# Patient Record
Sex: FEMALE | Race: WHITE | Employment: OTHER | URBAN - METROPOLITAN AREA
[De-identification: names, ages, dates, MRNs, and addresses within clinical notes are randomized per-mention and may not be internally consistent; named-entity substitution may affect disease eponyms.]

---

## 2017-01-17 ENCOUNTER — OFFICE VISIT (OUTPATIENT)
Dept: OBGYN | Age: 72
End: 2017-01-17

## 2017-01-17 VITALS
SYSTOLIC BLOOD PRESSURE: 138 MMHG | HEIGHT: 63 IN | BODY MASS INDEX: 31.36 KG/M2 | DIASTOLIC BLOOD PRESSURE: 84 MMHG | WEIGHT: 177 LBS

## 2017-01-17 DIAGNOSIS — M85.80 OSTEOPENIA: ICD-10-CM

## 2017-01-17 DIAGNOSIS — N81.89 PELVIC FLOOR WEAKNESS IN FEMALE: ICD-10-CM

## 2017-01-17 DIAGNOSIS — R32 INCONTINENCE IN FEMALE: Primary | ICD-10-CM

## 2017-01-17 PROCEDURE — 3017F COLORECTAL CA SCREEN DOC REV: CPT | Performed by: OBSTETRICS & GYNECOLOGY

## 2017-01-17 PROCEDURE — G8419 CALC BMI OUT NRM PARAM NOF/U: HCPCS | Performed by: OBSTETRICS & GYNECOLOGY

## 2017-01-17 PROCEDURE — 1090F PRES/ABSN URINE INCON ASSESS: CPT | Performed by: OBSTETRICS & GYNECOLOGY

## 2017-01-17 PROCEDURE — G8427 DOCREV CUR MEDS BY ELIG CLIN: HCPCS | Performed by: OBSTETRICS & GYNECOLOGY

## 2017-01-17 PROCEDURE — G8399 PT W/DXA RESULTS DOCUMENT: HCPCS | Performed by: OBSTETRICS & GYNECOLOGY

## 2017-01-17 PROCEDURE — 99213 OFFICE O/P EST LOW 20 MIN: CPT | Performed by: OBSTETRICS & GYNECOLOGY

## 2017-01-17 PROCEDURE — 0509F URINE INCON PLAN DOCD: CPT | Performed by: OBSTETRICS & GYNECOLOGY

## 2017-01-17 PROCEDURE — G8484 FLU IMMUNIZE NO ADMIN: HCPCS | Performed by: OBSTETRICS & GYNECOLOGY

## 2017-01-17 PROCEDURE — 1123F ACP DISCUSS/DSCN MKR DOCD: CPT | Performed by: OBSTETRICS & GYNECOLOGY

## 2017-01-17 PROCEDURE — 1036F TOBACCO NON-USER: CPT | Performed by: OBSTETRICS & GYNECOLOGY

## 2017-01-17 PROCEDURE — 3014F SCREEN MAMMO DOC REV: CPT | Performed by: OBSTETRICS & GYNECOLOGY

## 2017-01-17 PROCEDURE — 4040F PNEUMOC VAC/ADMIN/RCVD: CPT | Performed by: OBSTETRICS & GYNECOLOGY

## 2017-01-17 RX ORDER — RALOXIFENE HYDROCHLORIDE 60 MG/1
TABLET, FILM COATED ORAL
Qty: 90 TABLET | Refills: 11 | Status: SHIPPED | OUTPATIENT
Start: 2017-01-17 | End: 2018-01-23 | Stop reason: SDUPTHER

## 2017-01-17 RX ORDER — RALOXIFENE HYDROCHLORIDE 60 MG/1
60 TABLET, FILM COATED ORAL DAILY
Qty: 30 TABLET | Refills: 11 | Status: SHIPPED | OUTPATIENT
Start: 2017-01-17 | End: 2017-01-17 | Stop reason: SDUPTHER

## 2017-01-24 ENCOUNTER — OFFICE VISIT (OUTPATIENT)
Dept: PRIMARY CARE CLINIC | Age: 72
End: 2017-01-24

## 2017-01-24 VITALS
RESPIRATION RATE: 14 BRPM | SYSTOLIC BLOOD PRESSURE: 126 MMHG | WEIGHT: 178 LBS | TEMPERATURE: 97 F | OXYGEN SATURATION: 97 % | HEIGHT: 63 IN | DIASTOLIC BLOOD PRESSURE: 84 MMHG | HEART RATE: 84 BPM | BODY MASS INDEX: 31.54 KG/M2

## 2017-01-24 DIAGNOSIS — F41.9 ANXIETY: Primary | ICD-10-CM

## 2017-01-24 DIAGNOSIS — R42 VERTIGO: ICD-10-CM

## 2017-01-24 PROCEDURE — 1036F TOBACCO NON-USER: CPT | Performed by: FAMILY MEDICINE

## 2017-01-24 ASSESSMENT — ENCOUNTER SYMPTOMS
SHORTNESS OF BREATH: 0
CHEST TIGHTNESS: 0

## 2017-01-26 RX ORDER — DOCUSATE SODIUM 100 MG/1
CAPSULE, LIQUID FILLED ORAL
Qty: 30 CAPSULE | Refills: 0 | Status: SHIPPED | OUTPATIENT
Start: 2017-01-26 | End: 2017-02-26 | Stop reason: SDUPTHER

## 2017-01-30 ENCOUNTER — TELEPHONE (OUTPATIENT)
Dept: OBGYN | Age: 72
End: 2017-01-30

## 2017-01-30 DIAGNOSIS — R32 URINARY INCONTINENCE, UNSPECIFIED TYPE: Primary | ICD-10-CM

## 2017-02-05 ASSESSMENT — ENCOUNTER SYMPTOMS: ABDOMINAL PAIN: 0

## 2017-02-06 ENCOUNTER — TELEPHONE (OUTPATIENT)
Dept: OBGYN | Age: 72
End: 2017-02-06

## 2017-02-08 ENCOUNTER — TELEPHONE (OUTPATIENT)
Dept: PRIMARY CARE CLINIC | Age: 72
End: 2017-02-08

## 2017-02-09 DIAGNOSIS — J00 ACUTE NASOPHARYNGITIS: ICD-10-CM

## 2017-02-09 RX ORDER — AZITHROMYCIN 250 MG/1
TABLET, FILM COATED ORAL
Qty: 1 PACKET | Refills: 0 | Status: SHIPPED | OUTPATIENT
Start: 2017-02-09 | End: 2021-01-18 | Stop reason: SDUPTHER

## 2017-02-13 ENCOUNTER — TELEPHONE (OUTPATIENT)
Dept: PRIMARY CARE CLINIC | Age: 72
End: 2017-02-13

## 2017-02-13 DIAGNOSIS — N39.0 URINARY TRACT INFECTION, SITE UNSPECIFIED: Primary | ICD-10-CM

## 2017-02-13 RX ORDER — NITROFURANTOIN 25; 75 MG/1; MG/1
100 CAPSULE ORAL 2 TIMES DAILY
Qty: 20 CAPSULE | Refills: 0 | Status: SHIPPED | OUTPATIENT
Start: 2017-02-13 | End: 2018-10-09 | Stop reason: SDUPTHER

## 2017-02-14 ENCOUNTER — NURSE ONLY (OUTPATIENT)
Dept: PRIMARY CARE CLINIC | Age: 72
End: 2017-02-14

## 2017-02-14 DIAGNOSIS — N39.0 URINARY TRACT INFECTION, SITE UNSPECIFIED: ICD-10-CM

## 2017-02-14 DIAGNOSIS — N39.0 URINARY TRACT INFECTION, SITE UNSPECIFIED: Primary | ICD-10-CM

## 2017-02-16 LAB — URINE CULTURE, ROUTINE: NORMAL

## 2017-02-27 RX ORDER — CONJUGATED ESTROGENS 0.62 MG/G
CREAM VAGINAL
Qty: 30 G | Refills: 0 | Status: SHIPPED | OUTPATIENT
Start: 2017-02-27 | End: 2017-03-20

## 2017-02-27 RX ORDER — DOCUSATE SODIUM 100 MG
CAPSULE ORAL
Qty: 30 CAPSULE | Refills: 5 | Status: SHIPPED | OUTPATIENT
Start: 2017-02-27 | End: 2017-08-14 | Stop reason: SDUPTHER

## 2017-03-20 ENCOUNTER — OFFICE VISIT (OUTPATIENT)
Dept: PRIMARY CARE CLINIC | Age: 72
End: 2017-03-20

## 2017-03-20 VITALS
WEIGHT: 179 LBS | DIASTOLIC BLOOD PRESSURE: 74 MMHG | RESPIRATION RATE: 16 BRPM | SYSTOLIC BLOOD PRESSURE: 120 MMHG | HEIGHT: 63 IN | BODY MASS INDEX: 31.71 KG/M2 | HEART RATE: 79 BPM | OXYGEN SATURATION: 99 %

## 2017-03-20 DIAGNOSIS — R35.0 URINARY FREQUENCY: Primary | ICD-10-CM

## 2017-03-20 DIAGNOSIS — N39.41 URGE INCONTINENCE OF URINE: ICD-10-CM

## 2017-03-20 LAB
BILIRUBIN, POC: NORMAL
BLOOD URINE, POC: NORMAL
CLARITY, POC: CLEAR
COLOR, POC: YELLOW
GLUCOSE URINE, POC: NORMAL
KETONES, POC: NORMAL
LEUKOCYTE EST, POC: NORMAL
NITRITE, POC: NORMAL
PH, POC: 6
PROTEIN, POC: NORMAL
SPECIFIC GRAVITY, POC: 1.03
UROBILINOGEN, POC: NORMAL

## 2017-03-20 PROCEDURE — G8399 PT W/DXA RESULTS DOCUMENT: HCPCS | Performed by: FAMILY MEDICINE

## 2017-03-20 PROCEDURE — 1036F TOBACCO NON-USER: CPT | Performed by: FAMILY MEDICINE

## 2017-03-20 PROCEDURE — 99213 OFFICE O/P EST LOW 20 MIN: CPT | Performed by: FAMILY MEDICINE

## 2017-03-20 PROCEDURE — 3014F SCREEN MAMMO DOC REV: CPT | Performed by: FAMILY MEDICINE

## 2017-03-20 PROCEDURE — G8484 FLU IMMUNIZE NO ADMIN: HCPCS | Performed by: FAMILY MEDICINE

## 2017-03-20 PROCEDURE — 4040F PNEUMOC VAC/ADMIN/RCVD: CPT | Performed by: FAMILY MEDICINE

## 2017-03-20 PROCEDURE — 1123F ACP DISCUSS/DSCN MKR DOCD: CPT | Performed by: FAMILY MEDICINE

## 2017-03-20 PROCEDURE — 0509F URINE INCON PLAN DOCD: CPT | Performed by: FAMILY MEDICINE

## 2017-03-20 PROCEDURE — G8427 DOCREV CUR MEDS BY ELIG CLIN: HCPCS | Performed by: FAMILY MEDICINE

## 2017-03-20 PROCEDURE — 1090F PRES/ABSN URINE INCON ASSESS: CPT | Performed by: FAMILY MEDICINE

## 2017-03-20 PROCEDURE — G8417 CALC BMI ABV UP PARAM F/U: HCPCS | Performed by: FAMILY MEDICINE

## 2017-03-20 PROCEDURE — 3017F COLORECTAL CA SCREEN DOC REV: CPT | Performed by: FAMILY MEDICINE

## 2017-03-20 PROCEDURE — 81003 URINALYSIS AUTO W/O SCOPE: CPT | Performed by: FAMILY MEDICINE

## 2017-03-20 RX ORDER — FESOTERODINE FUMARATE 4 MG/1
4 TABLET, EXTENDED RELEASE ORAL DAILY
Qty: 30 TABLET | Refills: 1 | COMMUNITY
Start: 2017-03-20 | End: 2017-04-05 | Stop reason: SDUPTHER

## 2017-03-20 ASSESSMENT — ENCOUNTER SYMPTOMS
DIARRHEA: 0
NAUSEA: 0
SHORTNESS OF BREATH: 0
CONSTIPATION: 0
VOMITING: 0
ABDOMINAL PAIN: 0

## 2017-04-05 DIAGNOSIS — N39.41 URGE INCONTINENCE OF URINE: ICD-10-CM

## 2017-04-06 RX ORDER — FESOTERODINE FUMARATE 4 MG/1
4 TABLET, EXTENDED RELEASE ORAL DAILY
Qty: 30 TABLET | Refills: 1 | Status: SHIPPED | OUTPATIENT
Start: 2017-04-06 | End: 2017-06-21 | Stop reason: SDUPTHER

## 2017-04-11 ENCOUNTER — TELEPHONE (OUTPATIENT)
Dept: PRIMARY CARE CLINIC | Age: 72
End: 2017-04-11

## 2017-04-21 DIAGNOSIS — F41.9 ANXIETY: ICD-10-CM

## 2017-04-21 RX ORDER — CHLORDIAZEPOXIDE HYDROCHLORIDE AND CLIDINIUM BROMIDE 5; 2.5 MG/1; MG/1
1 CAPSULE ORAL 2 TIMES DAILY PRN
Qty: 60 CAPSULE | Refills: 0 | Status: SHIPPED | OUTPATIENT
Start: 2017-04-21 | End: 2017-10-02

## 2017-04-21 RX ORDER — ALPRAZOLAM 0.5 MG/1
0.5 TABLET ORAL 3 TIMES DAILY PRN
Qty: 90 TABLET | Refills: 2 | Status: SHIPPED | OUTPATIENT
Start: 2017-04-21 | End: 2017-06-12 | Stop reason: SDUPTHER

## 2017-04-27 ENCOUNTER — OFFICE VISIT (OUTPATIENT)
Dept: PRIMARY CARE CLINIC | Age: 72
End: 2017-04-27

## 2017-04-27 VITALS
HEART RATE: 72 BPM | TEMPERATURE: 97.1 F | DIASTOLIC BLOOD PRESSURE: 84 MMHG | HEIGHT: 63 IN | BODY MASS INDEX: 31.36 KG/M2 | RESPIRATION RATE: 12 BRPM | SYSTOLIC BLOOD PRESSURE: 126 MMHG | WEIGHT: 177 LBS

## 2017-04-27 DIAGNOSIS — M54.16 ACUTE RIGHT LUMBAR RADICULOPATHY: Primary | ICD-10-CM

## 2017-04-27 DIAGNOSIS — M54.41 ACUTE RIGHT-SIDED LOW BACK PAIN WITH RIGHT-SIDED SCIATICA: ICD-10-CM

## 2017-04-27 DIAGNOSIS — Z23 NEED FOR TETANUS BOOSTER: ICD-10-CM

## 2017-04-27 DIAGNOSIS — M54.31 SCIATICA OF RIGHT SIDE: ICD-10-CM

## 2017-04-27 PROCEDURE — 3017F COLORECTAL CA SCREEN DOC REV: CPT | Performed by: FAMILY MEDICINE

## 2017-04-27 PROCEDURE — 96372 THER/PROPH/DIAG INJ SC/IM: CPT | Performed by: FAMILY MEDICINE

## 2017-04-27 PROCEDURE — G8427 DOCREV CUR MEDS BY ELIG CLIN: HCPCS | Performed by: FAMILY MEDICINE

## 2017-04-27 PROCEDURE — 1090F PRES/ABSN URINE INCON ASSESS: CPT | Performed by: FAMILY MEDICINE

## 2017-04-27 PROCEDURE — 3014F SCREEN MAMMO DOC REV: CPT | Performed by: FAMILY MEDICINE

## 2017-04-27 PROCEDURE — 1036F TOBACCO NON-USER: CPT | Performed by: FAMILY MEDICINE

## 2017-04-27 PROCEDURE — 90714 TD VACC NO PRESV 7 YRS+ IM: CPT | Performed by: FAMILY MEDICINE

## 2017-04-27 PROCEDURE — 1123F ACP DISCUSS/DSCN MKR DOCD: CPT | Performed by: FAMILY MEDICINE

## 2017-04-27 PROCEDURE — G8417 CALC BMI ABV UP PARAM F/U: HCPCS | Performed by: FAMILY MEDICINE

## 2017-04-27 PROCEDURE — 4040F PNEUMOC VAC/ADMIN/RCVD: CPT | Performed by: FAMILY MEDICINE

## 2017-04-27 PROCEDURE — G8399 PT W/DXA RESULTS DOCUMENT: HCPCS | Performed by: FAMILY MEDICINE

## 2017-04-27 PROCEDURE — 90471 IMMUNIZATION ADMIN: CPT | Performed by: FAMILY MEDICINE

## 2017-04-27 PROCEDURE — 99214 OFFICE O/P EST MOD 30 MIN: CPT | Performed by: FAMILY MEDICINE

## 2017-04-27 RX ORDER — KETOROLAC TROMETHAMINE 30 MG/ML
60 INJECTION, SOLUTION INTRAMUSCULAR; INTRAVENOUS ONCE
Status: COMPLETED | OUTPATIENT
Start: 2017-04-27 | End: 2017-04-27

## 2017-04-27 RX ORDER — PREDNISONE 10 MG/1
TABLET ORAL
Qty: 20 TABLET | Refills: 0 | Status: SHIPPED | OUTPATIENT
Start: 2017-04-27 | End: 2017-05-07

## 2017-04-27 RX ORDER — METHOCARBAMOL 500 MG/1
500 TABLET, FILM COATED ORAL EVERY EVENING
Qty: 30 TABLET | Refills: 3 | Status: SHIPPED | OUTPATIENT
Start: 2017-04-27 | End: 2017-05-27

## 2017-04-27 RX ADMIN — KETOROLAC TROMETHAMINE 60 MG: 30 INJECTION, SOLUTION INTRAMUSCULAR; INTRAVENOUS at 12:00

## 2017-04-27 ASSESSMENT — ENCOUNTER SYMPTOMS
COUGH: 0
SHORTNESS OF BREATH: 0
BOWEL INCONTINENCE: 0
ABDOMINAL PAIN: 0
BACK PAIN: 1

## 2017-05-01 RX ORDER — CONJUGATED ESTROGENS 0.3 MG/1
TABLET, FILM COATED ORAL
Qty: 30 TABLET | Refills: 12 | Status: SHIPPED | OUTPATIENT
Start: 2017-05-01 | End: 2018-05-07 | Stop reason: SDUPTHER

## 2017-05-15 RX ORDER — CONJUGATED ESTROGENS 0.62 MG/G
CREAM VAGINAL
Qty: 30 G | Refills: 0 | Status: SHIPPED | OUTPATIENT
Start: 2017-05-15 | End: 2018-05-07 | Stop reason: SDUPTHER

## 2017-06-08 DIAGNOSIS — R42 VERTIGO: ICD-10-CM

## 2017-06-08 RX ORDER — MECLIZINE HYDROCHLORIDE 25 MG/1
25 TABLET ORAL 2 TIMES DAILY PRN
Qty: 30 TABLET | Refills: 2 | Status: SHIPPED | OUTPATIENT
Start: 2017-06-08 | End: 2017-06-12 | Stop reason: SDUPTHER

## 2017-06-12 ENCOUNTER — OFFICE VISIT (OUTPATIENT)
Dept: PRIMARY CARE CLINIC | Age: 72
End: 2017-06-12

## 2017-06-12 VITALS
OXYGEN SATURATION: 98 % | TEMPERATURE: 97.1 F | BODY MASS INDEX: 32.18 KG/M2 | RESPIRATION RATE: 16 BRPM | HEIGHT: 63 IN | SYSTOLIC BLOOD PRESSURE: 136 MMHG | WEIGHT: 181.6 LBS | HEART RATE: 75 BPM | DIASTOLIC BLOOD PRESSURE: 80 MMHG

## 2017-06-12 DIAGNOSIS — K58.0 IRRITABLE BOWEL SYNDROME WITH DIARRHEA: Primary | ICD-10-CM

## 2017-06-12 DIAGNOSIS — J30.89 OTHER ALLERGIC RHINITIS: ICD-10-CM

## 2017-06-12 DIAGNOSIS — R42 VERTIGO: ICD-10-CM

## 2017-06-12 DIAGNOSIS — F41.9 ANXIETY: ICD-10-CM

## 2017-06-12 PROCEDURE — G8399 PT W/DXA RESULTS DOCUMENT: HCPCS | Performed by: INTERNAL MEDICINE

## 2017-06-12 PROCEDURE — 1090F PRES/ABSN URINE INCON ASSESS: CPT | Performed by: INTERNAL MEDICINE

## 2017-06-12 PROCEDURE — 1036F TOBACCO NON-USER: CPT | Performed by: INTERNAL MEDICINE

## 2017-06-12 PROCEDURE — G8417 CALC BMI ABV UP PARAM F/U: HCPCS | Performed by: INTERNAL MEDICINE

## 2017-06-12 PROCEDURE — 1123F ACP DISCUSS/DSCN MKR DOCD: CPT | Performed by: INTERNAL MEDICINE

## 2017-06-12 PROCEDURE — G8427 DOCREV CUR MEDS BY ELIG CLIN: HCPCS | Performed by: INTERNAL MEDICINE

## 2017-06-12 PROCEDURE — 3014F SCREEN MAMMO DOC REV: CPT | Performed by: INTERNAL MEDICINE

## 2017-06-12 PROCEDURE — 4040F PNEUMOC VAC/ADMIN/RCVD: CPT | Performed by: INTERNAL MEDICINE

## 2017-06-12 PROCEDURE — 99214 OFFICE O/P EST MOD 30 MIN: CPT | Performed by: INTERNAL MEDICINE

## 2017-06-12 PROCEDURE — 3017F COLORECTAL CA SCREEN DOC REV: CPT | Performed by: INTERNAL MEDICINE

## 2017-06-12 RX ORDER — IPRATROPIUM BROMIDE 42 UG/1
2 SPRAY, METERED NASAL 3 TIMES DAILY
Qty: 1 BOTTLE | Refills: 3 | Status: SHIPPED | OUTPATIENT
Start: 2017-06-12 | End: 2017-06-12 | Stop reason: SDUPTHER

## 2017-06-12 RX ORDER — IPRATROPIUM BROMIDE 42 UG/1
SPRAY, METERED NASAL
Qty: 90 ML | Refills: 3 | Status: SHIPPED | OUTPATIENT
Start: 2017-06-12 | End: 2021-05-13

## 2017-06-12 RX ORDER — MECLIZINE HYDROCHLORIDE 25 MG/1
25 TABLET ORAL 2 TIMES DAILY PRN
Qty: 30 TABLET | Refills: 5 | Status: SHIPPED | OUTPATIENT
Start: 2017-06-12 | End: 2017-10-02

## 2017-06-12 RX ORDER — ALPRAZOLAM 0.5 MG/1
0.5 TABLET ORAL 3 TIMES DAILY PRN
Qty: 90 TABLET | Refills: 2 | Status: SHIPPED | OUTPATIENT
Start: 2017-06-12 | End: 2017-10-02 | Stop reason: SDUPTHER

## 2017-06-12 RX ORDER — CHLORDIAZEPOXIDE HYDROCHLORIDE AND CLIDINIUM BROMIDE 5; 2.5 MG/1; MG/1
1 CAPSULE ORAL 2 TIMES DAILY PRN
Qty: 60 CAPSULE | Refills: 2 | Status: SHIPPED | OUTPATIENT
Start: 2017-06-12 | End: 2017-10-02 | Stop reason: SDUPTHER

## 2017-06-12 ASSESSMENT — PATIENT HEALTH QUESTIONNAIRE - PHQ9
SUM OF ALL RESPONSES TO PHQ QUESTIONS 1-9: 0
1. LITTLE INTEREST OR PLEASURE IN DOING THINGS: 0
SUM OF ALL RESPONSES TO PHQ9 QUESTIONS 1 & 2: 0
2. FEELING DOWN, DEPRESSED OR HOPELESS: 0

## 2017-06-18 ASSESSMENT — ENCOUNTER SYMPTOMS
APNEA: 0
DIARRHEA: 1
BLOATING: 1
BLOOD IN STOOL: 0
ABDOMINAL DISTENTION: 0
PHOTOPHOBIA: 0
CHOKING: 0
FACIAL SWELLING: 0

## 2017-06-21 DIAGNOSIS — N39.41 URGE INCONTINENCE OF URINE: ICD-10-CM

## 2017-06-21 RX ORDER — FESOTERODINE FUMARATE 4 MG/1
TABLET, FILM COATED, EXTENDED RELEASE ORAL
Qty: 30 TABLET | Refills: 5 | Status: SHIPPED | OUTPATIENT
Start: 2017-06-21

## 2017-06-30 RX ORDER — PANTOPRAZOLE SODIUM 40 MG/1
TABLET, DELAYED RELEASE ORAL
Qty: 60 TABLET | Refills: 5 | Status: SHIPPED | OUTPATIENT
Start: 2017-06-30 | End: 2021-05-13

## 2017-07-16 DIAGNOSIS — K58.0 IRRITABLE BOWEL SYNDROME WITH DIARRHEA: ICD-10-CM

## 2017-07-16 RX ORDER — DICYCLOMINE HYDROCHLORIDE 10 MG/1
CAPSULE ORAL
Qty: 120 CAPSULE | Refills: 1 | Status: SHIPPED | OUTPATIENT
Start: 2017-07-16 | End: 2017-12-27 | Stop reason: SDUPTHER

## 2017-08-14 RX ORDER — DOCUSATE SODIUM 100 MG/1
CAPSULE, LIQUID FILLED ORAL
Qty: 30 CAPSULE | Refills: 5 | Status: SHIPPED | OUTPATIENT
Start: 2017-08-14 | End: 2018-02-21 | Stop reason: SDUPTHER

## 2017-08-22 DIAGNOSIS — R42 VERTIGO: ICD-10-CM

## 2017-08-22 RX ORDER — MECLIZINE HYDROCHLORIDE 25 MG/1
TABLET ORAL
Qty: 30 TABLET | Refills: 5 | Status: SHIPPED | OUTPATIENT
Start: 2017-08-22 | End: 2018-03-26 | Stop reason: SDUPTHER

## 2017-10-02 ENCOUNTER — OFFICE VISIT (OUTPATIENT)
Dept: PRIMARY CARE CLINIC | Age: 72
End: 2017-10-02

## 2017-10-02 VITALS
RESPIRATION RATE: 16 BRPM | SYSTOLIC BLOOD PRESSURE: 134 MMHG | BODY MASS INDEX: 32.67 KG/M2 | OXYGEN SATURATION: 97 % | TEMPERATURE: 97.7 F | DIASTOLIC BLOOD PRESSURE: 70 MMHG | HEIGHT: 63 IN | HEART RATE: 74 BPM | WEIGHT: 184.4 LBS

## 2017-10-02 DIAGNOSIS — F41.9 ANXIETY: Primary | ICD-10-CM

## 2017-10-02 DIAGNOSIS — Z23 NEED FOR INFLUENZA VACCINATION: ICD-10-CM

## 2017-10-02 DIAGNOSIS — K58.0 IRRITABLE BOWEL SYNDROME WITH DIARRHEA: ICD-10-CM

## 2017-10-02 DIAGNOSIS — G43.001 MIGRAINE WITHOUT AURA AND WITH STATUS MIGRAINOSUS, NOT INTRACTABLE: ICD-10-CM

## 2017-10-02 PROCEDURE — 4040F PNEUMOC VAC/ADMIN/RCVD: CPT | Performed by: INTERNAL MEDICINE

## 2017-10-02 PROCEDURE — G8427 DOCREV CUR MEDS BY ELIG CLIN: HCPCS | Performed by: INTERNAL MEDICINE

## 2017-10-02 PROCEDURE — G8417 CALC BMI ABV UP PARAM F/U: HCPCS | Performed by: INTERNAL MEDICINE

## 2017-10-02 PROCEDURE — 3014F SCREEN MAMMO DOC REV: CPT | Performed by: INTERNAL MEDICINE

## 2017-10-02 PROCEDURE — 1036F TOBACCO NON-USER: CPT | Performed by: INTERNAL MEDICINE

## 2017-10-02 PROCEDURE — 3017F COLORECTAL CA SCREEN DOC REV: CPT | Performed by: INTERNAL MEDICINE

## 2017-10-02 PROCEDURE — 1123F ACP DISCUSS/DSCN MKR DOCD: CPT | Performed by: INTERNAL MEDICINE

## 2017-10-02 PROCEDURE — 99213 OFFICE O/P EST LOW 20 MIN: CPT | Performed by: INTERNAL MEDICINE

## 2017-10-02 PROCEDURE — G8484 FLU IMMUNIZE NO ADMIN: HCPCS | Performed by: INTERNAL MEDICINE

## 2017-10-02 PROCEDURE — 90688 IIV4 VACCINE SPLT 0.5 ML IM: CPT | Performed by: INTERNAL MEDICINE

## 2017-10-02 PROCEDURE — G8399 PT W/DXA RESULTS DOCUMENT: HCPCS | Performed by: INTERNAL MEDICINE

## 2017-10-02 PROCEDURE — G0008 ADMIN INFLUENZA VIRUS VAC: HCPCS | Performed by: INTERNAL MEDICINE

## 2017-10-02 PROCEDURE — 1090F PRES/ABSN URINE INCON ASSESS: CPT | Performed by: INTERNAL MEDICINE

## 2017-10-02 RX ORDER — ALPRAZOLAM 0.5 MG/1
0.5 TABLET ORAL 3 TIMES DAILY PRN
Qty: 90 TABLET | Refills: 2 | Status: SHIPPED | OUTPATIENT
Start: 2017-10-02 | End: 2018-03-05 | Stop reason: SDUPTHER

## 2017-10-02 RX ORDER — SUMATRIPTAN 100 MG/1
100 TABLET, FILM COATED ORAL
Qty: 20 TABLET | Refills: 2 | Status: SHIPPED | OUTPATIENT
Start: 2017-10-02 | End: 2018-10-15

## 2017-10-02 RX ORDER — CHLORDIAZEPOXIDE HYDROCHLORIDE AND CLIDINIUM BROMIDE 5; 2.5 MG/1; MG/1
1 CAPSULE ORAL 2 TIMES DAILY PRN
Qty: 60 CAPSULE | Refills: 2 | Status: SHIPPED | OUTPATIENT
Start: 2017-10-02 | End: 2021-02-22

## 2017-10-02 NOTE — PROGRESS NOTES
Diana Benitez 74 y.o. female presents today with   Chief Complaint   Patient presents with    Otalgia     patient c/o bilateral ear pain over the past 6 months which is causing headaches. patient states she feels feverish today, and would also like a script for imitrex.  Flu Vaccine     patient statets she is allergic to eggs but has had the flu shot in the past. does not want to get the high dose flu shot but states she has had the regular dose in the past without reaction. Migraine    This is a recurrent problem. The current episode started more than 1 year ago. The problem occurs monthly. The problem has been waxing and waning. The pain is located in the left unilateral region. The quality of the pain is described as aching and throbbing. The pain is at a severity of 7/10. The pain is severe. Associated symptoms include insomnia. Pertinent negatives include no abdominal pain, facial sweating, fever or photophobia. Mental Health Problem   The primary symptoms include dysphoric mood. The primary symptoms do not include hallucinations. The current episode started more than 1 month ago. This is a recurrent problem. The onset of the illness is precipitated by emotional stress and a stressful event. The degree of incapacity that she is experiencing as a consequence of her illness is mild. Additional symptoms of the illness include anhedonia, insomnia, agitation and distractible. Additional symptoms of the illness do not include no abdominal pain. She does not admit to suicidal ideas. Risk factors that are present for mental illness include a history of mental illness. Diarrhea    This is a recurrent problem. The current episode started more than 1 year ago. The problem occurs 2 to 4 times per day. The problem has been waxing and waning. The stool consistency is described as watery. Associated symptoms include arthralgias. Pertinent negatives include no abdominal pain, chills or fever.  The symptoms are aggravated by stress. Past Medical History:   Diagnosis Date    Anxiety     Irritable bowel syndrome (IBS)     Migraine     Sciatica of right side 4/27/2017     Patient Active Problem List    Diagnosis Date Noted    Acute right-sided low back pain with right-sided sciatica 04/27/2017    Sciatica of right side 04/27/2017     Past Surgical History:   Procedure Laterality Date    HYSTERECTOMY       No family history on file. Social History     Social History    Marital status:      Spouse name: N/A    Number of children: N/A    Years of education: N/A     Social History Main Topics    Smoking status: Never Smoker    Smokeless tobacco: Never Used    Alcohol use Yes      Comment: 1 daily    Drug use: No    Sexual activity: Not Asked     Other Topics Concern    None     Social History Narrative     Allergies   Allergen Reactions    Albumen, Egg Diarrhea       Review of Systems   Constitutional: Negative for chills and fever. HENT: Negative for facial swelling and nosebleeds. Eyes: Negative for photophobia and visual disturbance. Respiratory: Negative for apnea and choking. Cardiovascular: Negative for chest pain and palpitations. Gastrointestinal: Positive for diarrhea. Negative for abdominal distention, abdominal pain and blood in stool. Genitourinary: Negative for enuresis, hematuria and vaginal bleeding. Musculoskeletal: Positive for arthralgias. Negative for gait problem and joint swelling. Skin: Negative for rash. Neurological: Negative for syncope and speech difficulty. Hematological: Does not bruise/bleed easily. Psychiatric/Behavioral: Positive for agitation and dysphoric mood. Negative for hallucinations and suicidal ideas. The patient has insomnia.             Vitals:    10/02/17 1128   BP: 134/70   Site: Right Arm   Position: Sitting   Cuff Size: Small Adult   Pulse: 74   Resp: 16   Temp: 97.7 °F (36.5 °C)   TempSrc: Oral   SpO2: 97%   Weight: 184 lb 6.4

## 2017-10-02 NOTE — PROGRESS NOTES
Vaccine Information Sheet, \"Influenza - Inactivated\"  given to Lala Landers, or parent/legal guardian of  Lala Landers and verbalized understanding. Patient responses:    Have you ever had a reaction to a flu vaccine? No  Are you able to eat eggs without adverse effects? Patient states she has egg allergies, but has had flu shots in the past with no reaction. Do you have any current illness? No  Have you ever had Guillian S[place]rav[place] Syndrome? No    Flu vaccine given per order. Please see immunization tab.

## 2017-10-04 ASSESSMENT — ENCOUNTER SYMPTOMS
BLOOD IN STOOL: 0
FACIAL SWELLING: 0
ABDOMINAL DISTENTION: 0
CHOKING: 0
DIARRHEA: 1
ABDOMINAL PAIN: 0
APNEA: 0
PHOTOPHOBIA: 0
FACIAL SWEATING: 0

## 2017-10-31 ENCOUNTER — OFFICE VISIT (OUTPATIENT)
Dept: PRIMARY CARE CLINIC | Age: 72
End: 2017-10-31

## 2017-10-31 VITALS
HEART RATE: 78 BPM | DIASTOLIC BLOOD PRESSURE: 68 MMHG | HEIGHT: 63 IN | SYSTOLIC BLOOD PRESSURE: 120 MMHG | RESPIRATION RATE: 14 BRPM | WEIGHT: 185.1 LBS | BODY MASS INDEX: 32.8 KG/M2 | TEMPERATURE: 97.7 F | OXYGEN SATURATION: 97 %

## 2017-10-31 DIAGNOSIS — R10.13 DYSPEPSIA: ICD-10-CM

## 2017-10-31 DIAGNOSIS — K21.9 GASTROESOPHAGEAL REFLUX DISEASE WITHOUT ESOPHAGITIS: ICD-10-CM

## 2017-10-31 DIAGNOSIS — R42 VERTIGO: Primary | ICD-10-CM

## 2017-10-31 DIAGNOSIS — Z12.11 ENCOUNTER FOR SCREENING FECAL OCCULT BLOOD TESTING: ICD-10-CM

## 2017-10-31 LAB
HEMOCCULT STL QL: NORMAL

## 2017-10-31 PROCEDURE — 82270 OCCULT BLOOD FECES: CPT | Performed by: INTERNAL MEDICINE

## 2017-10-31 PROCEDURE — 3014F SCREEN MAMMO DOC REV: CPT | Performed by: INTERNAL MEDICINE

## 2017-10-31 PROCEDURE — 99214 OFFICE O/P EST MOD 30 MIN: CPT | Performed by: INTERNAL MEDICINE

## 2017-10-31 PROCEDURE — 3017F COLORECTAL CA SCREEN DOC REV: CPT | Performed by: INTERNAL MEDICINE

## 2017-10-31 PROCEDURE — 1090F PRES/ABSN URINE INCON ASSESS: CPT | Performed by: INTERNAL MEDICINE

## 2017-10-31 PROCEDURE — G8399 PT W/DXA RESULTS DOCUMENT: HCPCS | Performed by: INTERNAL MEDICINE

## 2017-10-31 PROCEDURE — G8417 CALC BMI ABV UP PARAM F/U: HCPCS | Performed by: INTERNAL MEDICINE

## 2017-10-31 PROCEDURE — G8427 DOCREV CUR MEDS BY ELIG CLIN: HCPCS | Performed by: INTERNAL MEDICINE

## 2017-10-31 PROCEDURE — 4040F PNEUMOC VAC/ADMIN/RCVD: CPT | Performed by: INTERNAL MEDICINE

## 2017-10-31 PROCEDURE — 1123F ACP DISCUSS/DSCN MKR DOCD: CPT | Performed by: INTERNAL MEDICINE

## 2017-10-31 PROCEDURE — G8484 FLU IMMUNIZE NO ADMIN: HCPCS | Performed by: INTERNAL MEDICINE

## 2017-10-31 PROCEDURE — 1036F TOBACCO NON-USER: CPT | Performed by: INTERNAL MEDICINE

## 2017-10-31 RX ORDER — SUCRALFATE ORAL 1 G/10ML
1 SUSPENSION ORAL 4 TIMES DAILY
Qty: 420 ML | Refills: 5 | Status: SHIPPED | OUTPATIENT
Start: 2017-10-31 | End: 2018-07-09 | Stop reason: SDUPTHER

## 2017-10-31 RX ORDER — SUCRALFATE ORAL 1 G/10ML
1 SUSPENSION ORAL 4 TIMES DAILY
Qty: 420 ML | Refills: 5 | Status: SHIPPED | OUTPATIENT
Start: 2017-10-31 | End: 2017-10-31 | Stop reason: SDUPTHER

## 2017-10-31 NOTE — PROGRESS NOTES
Lulu Lopez 74 y.o. female presents today with   Chief Complaint   Patient presents with    Dizziness     patient is here to follow up chronic ear pain, dizziness and headaches. patient states this has been going on and off over the past few years. patient has been taking pantoprazole for stomach ache, caused by one of her medications for headaches. patient states she takes acetaminophen sometimes and Imitrex which helps a little, but anything stronger aggravates her stomach. patient states some of her symptoms will subside when stress is low, but return during high periods of stress.  Discuss Labs     patient brought in stool test to check. Neurologic Problem   The patient's pertinent negatives include no syncope. Primary symptoms comment: dizzyness. This is a recurrent problem. The current episode started 1 to 4 weeks ago. The neurological problem developed suddenly. The problem has been gradually improving since onset. Associated symptoms include neck pain. Pertinent negatives include no abdominal pain, chest pain, confusion, diaphoresis, fever, nausea or palpitations. Gastroesophageal Reflux   She complains of dysphagia and heartburn. She reports no abdominal pain, no chest pain, no choking or no nausea. dizzyness. This is a recurrent problem. The current episode started more than 1 year ago. The problem occurs frequently. The problem has been waxing and waning. Past Medical History:   Diagnosis Date    Anxiety     Irritable bowel syndrome (IBS)     Migraine     Sciatica of right side 4/27/2017    Vertigo      Patient Active Problem List    Diagnosis Date Noted    Acute right-sided low back pain with right-sided sciatica 04/27/2017    Sciatica of right side 04/27/2017     Past Surgical History:   Procedure Laterality Date    HYSTERECTOMY       No family history on file.   Social History     Social History    Marital status:      Spouse name: N/A    Number of children: N/A  Years of education: N/A     Social History Main Topics    Smoking status: Never Smoker    Smokeless tobacco: Never Used    Alcohol use Yes      Comment: 1 daily    Drug use: No    Sexual activity: Not Asked     Other Topics Concern    None     Social History Narrative    None     Allergies   Allergen Reactions    Albumen, Egg Diarrhea       Review of Systems   Constitutional: Negative for chills, diaphoresis and fever. HENT: Negative for facial swelling and nosebleeds. Eyes: Negative for photophobia and visual disturbance. Respiratory: Negative for apnea and choking. Cardiovascular: Negative for chest pain and palpitations. Gastrointestinal: Positive for dysphagia and heartburn. Negative for abdominal distention, abdominal pain, blood in stool and nausea. Genitourinary: Negative for enuresis, hematuria and vaginal bleeding. Musculoskeletal: Positive for arthralgias and neck pain. Negative for gait problem and joint swelling. Skin: Negative for rash. Neurological: Negative for syncope and speech difficulty. Hematological: Does not bruise/bleed easily. Psychiatric/Behavioral: Negative for confusion, hallucinations and suicidal ideas. Vitals:    10/31/17 1425   BP: 120/68   Site: Right Arm   Position: Sitting   Cuff Size: Small Adult   Pulse: 78   Resp: 14   Temp: 97.7 °F (36.5 °C)   TempSrc: Tympanic   SpO2: 97%   Weight: 185 lb 1.6 oz (84 kg)   Height: 5' 3\" (1.6 m)       Physical Exam   Constitutional: She appears well-developed. HENT:   Head: Normocephalic. Eyes: Conjunctivae and EOM are normal.   Neck: Normal range of motion. No tracheal deviation present. Cardiovascular: Normal rate and regular rhythm. Pulmonary/Chest: Effort normal. No respiratory distress. She has no wheezes. Abdominal: She exhibits no distension. Musculoskeletal: Normal range of motion. Neurological: She is alert. No cranial nerve deficit. Coordination normal.   Skin: Skin is warm. Psychiatric: She has a normal mood and affect. Assessment/Plan  Mara Denton was seen today for dizziness and discuss labs. Diagnoses and all orders for this visit:    Vertigo    Gastroesophageal reflux disease without esophagitis    Dyspepsia  -     Discontinue: sucralfate (CARAFATE) 1 GM/10ML suspension; Take 10 mLs by mouth 4 times daily  -     sucralfate (CARAFATE) 1 GM/10ML suspension; Take 10 mLs by mouth 4 times daily    Encounter for screening fecal occult blood testing  -     POCT occult blood stool        No Follow-up on file.     Bernie Nelson MD

## 2017-11-03 ASSESSMENT — ENCOUNTER SYMPTOMS
HEARTBURN: 1
PHOTOPHOBIA: 0
ABDOMINAL PAIN: 0
BLOOD IN STOOL: 0
ABDOMINAL DISTENTION: 0
FACIAL SWELLING: 0
CHOKING: 0
APNEA: 0
NAUSEA: 0

## 2017-12-27 DIAGNOSIS — K58.0 IRRITABLE BOWEL SYNDROME WITH DIARRHEA: ICD-10-CM

## 2017-12-27 RX ORDER — DICYCLOMINE HYDROCHLORIDE 10 MG/1
CAPSULE ORAL
Qty: 120 CAPSULE | Refills: 1 | Status: SHIPPED | OUTPATIENT
Start: 2017-12-27 | End: 2018-04-26 | Stop reason: SDUPTHER

## 2018-01-22 DIAGNOSIS — M85.80 OSTEOPENIA, UNSPECIFIED LOCATION: ICD-10-CM

## 2018-01-22 DIAGNOSIS — M85.80 OSTEOPENIA: ICD-10-CM

## 2018-01-22 RX ORDER — RALOXIFENE HYDROCHLORIDE 60 MG/1
60 TABLET, FILM COATED ORAL DAILY
Qty: 30 TABLET | Refills: 0 | OUTPATIENT
Start: 2018-01-22

## 2018-01-24 RX ORDER — RALOXIFENE HYDROCHLORIDE 60 MG/1
TABLET, FILM COATED ORAL
Qty: 90 TABLET | Refills: 0 | Status: SHIPPED | OUTPATIENT
Start: 2018-01-24 | End: 2018-04-06 | Stop reason: SDUPTHER

## 2018-02-20 RX ORDER — CONJUGATED ESTROGENS 0.62 MG/G
CREAM VAGINAL
Qty: 30 G | Refills: 0 | Status: SHIPPED | OUTPATIENT
Start: 2018-02-20 | End: 2018-03-20 | Stop reason: SDUPTHER

## 2018-02-21 RX ORDER — DOCUSATE SODIUM 100 MG/1
CAPSULE, LIQUID FILLED ORAL
Qty: 30 CAPSULE | Refills: 3 | Status: SHIPPED | OUTPATIENT
Start: 2018-02-21 | End: 2018-06-28 | Stop reason: SDUPTHER

## 2018-02-27 DIAGNOSIS — F41.9 ANXIETY: ICD-10-CM

## 2018-02-28 RX ORDER — ALPRAZOLAM 0.5 MG/1
TABLET ORAL
Qty: 90 TABLET | Refills: 0 | OUTPATIENT
Start: 2018-02-28

## 2018-02-28 NOTE — TELEPHONE ENCOUNTER
Patient was last seen 10-31-17    Patient is in need of an appointment      Please verify if the patient will need a short supply until their next appointment to go to their LOCAL pharmacy

## 2018-03-05 ENCOUNTER — OFFICE VISIT (OUTPATIENT)
Dept: PRIMARY CARE CLINIC | Age: 73
End: 2018-03-05
Payer: MEDICARE

## 2018-03-05 VITALS
RESPIRATION RATE: 16 BRPM | DIASTOLIC BLOOD PRESSURE: 80 MMHG | HEART RATE: 75 BPM | TEMPERATURE: 96.7 F | OXYGEN SATURATION: 97 % | WEIGHT: 182.9 LBS | BODY MASS INDEX: 32.41 KG/M2 | HEIGHT: 63 IN | SYSTOLIC BLOOD PRESSURE: 130 MMHG

## 2018-03-05 DIAGNOSIS — R39.15 URINARY URGENCY: Primary | ICD-10-CM

## 2018-03-05 DIAGNOSIS — F41.9 ANXIETY: ICD-10-CM

## 2018-03-05 PROCEDURE — 1036F TOBACCO NON-USER: CPT | Performed by: INTERNAL MEDICINE

## 2018-03-05 PROCEDURE — 3017F COLORECTAL CA SCREEN DOC REV: CPT | Performed by: INTERNAL MEDICINE

## 2018-03-05 PROCEDURE — G8482 FLU IMMUNIZE ORDER/ADMIN: HCPCS | Performed by: INTERNAL MEDICINE

## 2018-03-05 PROCEDURE — 1090F PRES/ABSN URINE INCON ASSESS: CPT | Performed by: INTERNAL MEDICINE

## 2018-03-05 PROCEDURE — G8417 CALC BMI ABV UP PARAM F/U: HCPCS | Performed by: INTERNAL MEDICINE

## 2018-03-05 PROCEDURE — G8427 DOCREV CUR MEDS BY ELIG CLIN: HCPCS | Performed by: INTERNAL MEDICINE

## 2018-03-05 PROCEDURE — 4040F PNEUMOC VAC/ADMIN/RCVD: CPT | Performed by: INTERNAL MEDICINE

## 2018-03-05 PROCEDURE — 99213 OFFICE O/P EST LOW 20 MIN: CPT | Performed by: INTERNAL MEDICINE

## 2018-03-05 PROCEDURE — 1123F ACP DISCUSS/DSCN MKR DOCD: CPT | Performed by: INTERNAL MEDICINE

## 2018-03-05 PROCEDURE — G8399 PT W/DXA RESULTS DOCUMENT: HCPCS | Performed by: INTERNAL MEDICINE

## 2018-03-05 PROCEDURE — 3014F SCREEN MAMMO DOC REV: CPT | Performed by: INTERNAL MEDICINE

## 2018-03-05 RX ORDER — ALPRAZOLAM 0.5 MG/1
0.5 TABLET ORAL 3 TIMES DAILY PRN
Qty: 90 TABLET | Refills: 2 | Status: SHIPPED | OUTPATIENT
Start: 2018-03-05 | End: 2018-08-08 | Stop reason: SDUPTHER

## 2018-03-09 ASSESSMENT — ENCOUNTER SYMPTOMS
APNEA: 0
BLOOD IN STOOL: 0
CHOKING: 0
FACIAL SWELLING: 0
PHOTOPHOBIA: 0
ABDOMINAL DISTENTION: 0

## 2018-03-13 ENCOUNTER — TELEPHONE (OUTPATIENT)
Dept: PRIMARY CARE CLINIC | Age: 73
End: 2018-03-13

## 2018-03-13 DIAGNOSIS — R39.15 URINARY URGENCY: Primary | ICD-10-CM

## 2018-03-13 RX ORDER — OXYBUTYNIN CHLORIDE 5 MG/1
5 TABLET, EXTENDED RELEASE ORAL DAILY
Qty: 30 TABLET | Refills: 5 | Status: SHIPPED | OUTPATIENT
Start: 2018-03-13 | End: 2018-05-07

## 2018-03-16 ENCOUNTER — TELEPHONE (OUTPATIENT)
Dept: PRIMARY CARE CLINIC | Age: 73
End: 2018-03-16

## 2018-03-16 DIAGNOSIS — R39.15 URINARY URGENCY: Primary | ICD-10-CM

## 2018-03-16 RX ORDER — SOLIFENACIN SUCCINATE 5 MG/1
5 TABLET, FILM COATED ORAL DAILY
Qty: 30 TABLET | Refills: 3 | Status: SHIPPED | OUTPATIENT
Start: 2018-03-16 | End: 2018-10-15

## 2018-03-22 RX ORDER — CONJUGATED ESTROGENS 0.62 MG/G
CREAM VAGINAL
Qty: 30 G | Refills: 0 | Status: SHIPPED | OUTPATIENT
Start: 2018-03-22 | End: 2018-04-27 | Stop reason: SDUPTHER

## 2018-03-26 DIAGNOSIS — R42 VERTIGO: ICD-10-CM

## 2018-03-26 RX ORDER — MECLIZINE HYDROCHLORIDE 25 MG/1
TABLET ORAL
Qty: 30 TABLET | Refills: 3 | Status: SHIPPED | OUTPATIENT
Start: 2018-03-26 | End: 2018-06-03 | Stop reason: SDUPTHER

## 2018-03-26 NOTE — TELEPHONE ENCOUNTER
pt requesting refill. Please approve or deny this refill request. The order is pended. Thank you.     LOV 3/5/2018    Next Visit Date:  Future Appointments  Date Time Provider Sonali Harryi   4/10/2018 1:40 PM Jonny Antunez MD [facility]       Requested Prescriptions     Pending Prescriptions Disp Refills    meclizine (ANTIVERT) 25 MG tablet [Pharmacy Med Name: MECLIZINE 25MG RX TABLETS] 30 tablet 3     Sig: TAKE 1 TABLET BY MOUTH TWICE DAILY AS NEEDED FOR DIZZINESS OR NAUSEA

## 2018-03-30 ENCOUNTER — TELEPHONE (OUTPATIENT)
Dept: OBGYN CLINIC | Age: 73
End: 2018-03-30

## 2018-03-30 DIAGNOSIS — M85.80 OSTEOPENIA, UNSPECIFIED LOCATION: ICD-10-CM

## 2018-04-06 DIAGNOSIS — M85.80 OSTEOPENIA, UNSPECIFIED LOCATION: ICD-10-CM

## 2018-04-06 RX ORDER — RALOXIFENE HYDROCHLORIDE 60 MG/1
TABLET, FILM COATED ORAL
Qty: 30 TABLET | Refills: 0 | Status: SHIPPED | OUTPATIENT
Start: 2018-04-06 | End: 2018-05-09 | Stop reason: SDUPTHER

## 2018-04-09 RX ORDER — RALOXIFENE HYDROCHLORIDE 60 MG/1
TABLET, FILM COATED ORAL
Qty: 90 TABLET | Refills: 0 | OUTPATIENT
Start: 2018-04-09

## 2018-04-16 ENCOUNTER — OFFICE VISIT (OUTPATIENT)
Dept: PRIMARY CARE CLINIC | Age: 73
End: 2018-04-16
Payer: MEDICARE

## 2018-04-16 VITALS
OXYGEN SATURATION: 95 % | HEART RATE: 81 BPM | SYSTOLIC BLOOD PRESSURE: 130 MMHG | HEIGHT: 63 IN | BODY MASS INDEX: 32.27 KG/M2 | DIASTOLIC BLOOD PRESSURE: 90 MMHG | WEIGHT: 182.1 LBS | RESPIRATION RATE: 14 BRPM | TEMPERATURE: 96.3 F

## 2018-04-16 DIAGNOSIS — R42 VERTIGO: Primary | ICD-10-CM

## 2018-04-16 DIAGNOSIS — H69.83 DYSFUNCTION OF BOTH EUSTACHIAN TUBES: ICD-10-CM

## 2018-04-16 PROCEDURE — G8399 PT W/DXA RESULTS DOCUMENT: HCPCS | Performed by: INTERNAL MEDICINE

## 2018-04-16 PROCEDURE — 1123F ACP DISCUSS/DSCN MKR DOCD: CPT | Performed by: INTERNAL MEDICINE

## 2018-04-16 PROCEDURE — G8417 CALC BMI ABV UP PARAM F/U: HCPCS | Performed by: INTERNAL MEDICINE

## 2018-04-16 PROCEDURE — 99213 OFFICE O/P EST LOW 20 MIN: CPT | Performed by: INTERNAL MEDICINE

## 2018-04-16 PROCEDURE — 1036F TOBACCO NON-USER: CPT | Performed by: INTERNAL MEDICINE

## 2018-04-16 PROCEDURE — 4040F PNEUMOC VAC/ADMIN/RCVD: CPT | Performed by: INTERNAL MEDICINE

## 2018-04-16 PROCEDURE — 1090F PRES/ABSN URINE INCON ASSESS: CPT | Performed by: INTERNAL MEDICINE

## 2018-04-16 PROCEDURE — 3014F SCREEN MAMMO DOC REV: CPT | Performed by: INTERNAL MEDICINE

## 2018-04-16 PROCEDURE — G8427 DOCREV CUR MEDS BY ELIG CLIN: HCPCS | Performed by: INTERNAL MEDICINE

## 2018-04-16 PROCEDURE — 3017F COLORECTAL CA SCREEN DOC REV: CPT | Performed by: INTERNAL MEDICINE

## 2018-04-19 ASSESSMENT — ENCOUNTER SYMPTOMS
FACIAL SWELLING: 0
PHOTOPHOBIA: 0
CHOKING: 0
APNEA: 0
BLOOD IN STOOL: 0
ABDOMINAL DISTENTION: 0

## 2018-04-26 DIAGNOSIS — K58.0 IRRITABLE BOWEL SYNDROME WITH DIARRHEA: ICD-10-CM

## 2018-04-26 RX ORDER — DICYCLOMINE HYDROCHLORIDE 10 MG/1
CAPSULE ORAL
Qty: 120 CAPSULE | Refills: 5 | Status: SHIPPED | OUTPATIENT
Start: 2018-04-26 | End: 2019-03-04 | Stop reason: SDUPTHER

## 2018-04-27 RX ORDER — CONJUGATED ESTROGENS 0.62 MG/G
CREAM VAGINAL
Qty: 30 G | Refills: 0 | Status: SHIPPED | OUTPATIENT
Start: 2018-04-27 | End: 2018-05-07 | Stop reason: SDUPTHER

## 2018-05-07 ENCOUNTER — OFFICE VISIT (OUTPATIENT)
Dept: OBGYN CLINIC | Age: 73
End: 2018-05-07
Payer: MEDICARE

## 2018-05-07 VITALS
BODY MASS INDEX: 32.25 KG/M2 | WEIGHT: 182 LBS | HEIGHT: 63 IN | DIASTOLIC BLOOD PRESSURE: 82 MMHG | SYSTOLIC BLOOD PRESSURE: 136 MMHG

## 2018-05-07 DIAGNOSIS — Z78.0 POSTMENOPAUSAL: ICD-10-CM

## 2018-05-07 DIAGNOSIS — Z11.51 SCREENING FOR HPV (HUMAN PAPILLOMAVIRUS): ICD-10-CM

## 2018-05-07 DIAGNOSIS — Z12.31 SCREENING MAMMOGRAM, ENCOUNTER FOR: ICD-10-CM

## 2018-05-07 DIAGNOSIS — Z01.419 WELL WOMAN EXAM WITH ROUTINE GYNECOLOGICAL EXAM: Primary | ICD-10-CM

## 2018-05-07 PROCEDURE — G8417 CALC BMI ABV UP PARAM F/U: HCPCS | Performed by: OBSTETRICS & GYNECOLOGY

## 2018-05-07 PROCEDURE — G8427 DOCREV CUR MEDS BY ELIG CLIN: HCPCS | Performed by: OBSTETRICS & GYNECOLOGY

## 2018-05-07 PROCEDURE — G0101 CA SCREEN;PELVIC/BREAST EXAM: HCPCS | Performed by: OBSTETRICS & GYNECOLOGY

## 2018-05-07 ASSESSMENT — ENCOUNTER SYMPTOMS
SINUS PRESSURE: 0
CONSTIPATION: 0
ABDOMINAL PAIN: 0
BLOOD IN STOOL: 0
WHEEZING: 0
SHORTNESS OF BREATH: 0
COLOR CHANGE: 0
VOMITING: 0
COUGH: 0
NAUSEA: 0

## 2018-05-07 ASSESSMENT — PATIENT HEALTH QUESTIONNAIRE - PHQ9
SUM OF ALL RESPONSES TO PHQ9 QUESTIONS 1 & 2: 0
SUM OF ALL RESPONSES TO PHQ QUESTIONS 1-9: 0
2. FEELING DOWN, DEPRESSED OR HOPELESS: 0
1. LITTLE INTEREST OR PLEASURE IN DOING THINGS: 0

## 2018-05-09 DIAGNOSIS — M85.80 OSTEOPENIA, UNSPECIFIED LOCATION: ICD-10-CM

## 2018-05-09 RX ORDER — CONJUGATED ESTROGENS 0.3 MG/1
TABLET, FILM COATED ORAL
Qty: 30 TABLET | Refills: 3 | Status: SHIPPED | OUTPATIENT
Start: 2018-05-09 | End: 2018-09-17 | Stop reason: SDUPTHER

## 2018-05-09 RX ORDER — RALOXIFENE HYDROCHLORIDE 60 MG/1
TABLET, FILM COATED ORAL
Qty: 30 TABLET | Refills: 5 | Status: SHIPPED | OUTPATIENT
Start: 2018-05-09 | End: 2018-05-10 | Stop reason: SDUPTHER

## 2018-05-10 DIAGNOSIS — M85.80 OSTEOPENIA, UNSPECIFIED LOCATION: ICD-10-CM

## 2018-05-11 DIAGNOSIS — Z01.419 WELL WOMAN EXAM WITH ROUTINE GYNECOLOGICAL EXAM: ICD-10-CM

## 2018-05-11 DIAGNOSIS — Z11.51 SCREENING FOR HPV (HUMAN PAPILLOMAVIRUS): ICD-10-CM

## 2018-05-14 RX ORDER — RALOXIFENE HYDROCHLORIDE 60 MG/1
TABLET, FILM COATED ORAL
Qty: 90 TABLET | Refills: 4 | Status: SHIPPED | OUTPATIENT
Start: 2018-05-14

## 2018-05-31 RX ORDER — CONJUGATED ESTROGENS 0.62 MG/G
CREAM VAGINAL
Qty: 30 G | Refills: 0 | Status: SHIPPED | OUTPATIENT
Start: 2018-05-31 | End: 2018-11-23 | Stop reason: SDUPTHER

## 2018-06-03 DIAGNOSIS — R42 VERTIGO: ICD-10-CM

## 2018-06-03 RX ORDER — MECLIZINE HYDROCHLORIDE 25 MG/1
TABLET ORAL
Qty: 30 TABLET | Refills: 0 | Status: SHIPPED | OUTPATIENT
Start: 2018-06-03 | End: 2018-06-19 | Stop reason: SDUPTHER

## 2018-06-19 DIAGNOSIS — R42 VERTIGO: ICD-10-CM

## 2018-06-19 RX ORDER — MECLIZINE HYDROCHLORIDE 25 MG/1
TABLET ORAL
Qty: 30 TABLET | Refills: 0 | Status: SHIPPED | OUTPATIENT
Start: 2018-06-19 | End: 2018-07-10 | Stop reason: SDUPTHER

## 2018-06-25 ENCOUNTER — HOSPITAL ENCOUNTER (OUTPATIENT)
Dept: WOMENS IMAGING | Age: 73
Discharge: HOME | End: 2018-06-27
Payer: MEDICARE

## 2018-06-25 DIAGNOSIS — Z78.0 POSTMENOPAUSAL: ICD-10-CM

## 2018-06-25 DIAGNOSIS — Z12.31 SCREENING MAMMOGRAM, ENCOUNTER FOR: ICD-10-CM

## 2018-06-25 PROCEDURE — 77067 SCR MAMMO BI INCL CAD: CPT

## 2018-06-28 RX ORDER — DOCUSATE SODIUM 100 MG/1
CAPSULE, LIQUID FILLED ORAL
Qty: 30 CAPSULE | Refills: 0 | Status: SHIPPED | OUTPATIENT
Start: 2018-06-28 | End: 2018-07-29 | Stop reason: SDUPTHER

## 2018-07-09 DIAGNOSIS — R10.13 DYSPEPSIA: ICD-10-CM

## 2018-07-09 RX ORDER — SUCRALFATE ORAL 1 G/10ML
1 SUSPENSION ORAL 4 TIMES DAILY
Qty: 420 ML | Refills: 5 | Status: SHIPPED | OUTPATIENT
Start: 2018-07-09 | End: 2018-10-15

## 2018-07-10 DIAGNOSIS — R42 VERTIGO: ICD-10-CM

## 2018-07-10 RX ORDER — MECLIZINE HYDROCHLORIDE 25 MG/1
TABLET ORAL
Qty: 30 TABLET | Refills: 0 | Status: SHIPPED | OUTPATIENT
Start: 2018-07-10 | End: 2018-07-26 | Stop reason: SDUPTHER

## 2018-07-26 DIAGNOSIS — R42 VERTIGO: ICD-10-CM

## 2018-07-26 RX ORDER — MECLIZINE HYDROCHLORIDE 25 MG/1
TABLET ORAL
Qty: 30 TABLET | Refills: 0 | Status: SHIPPED | OUTPATIENT
Start: 2018-07-26 | End: 2018-08-08 | Stop reason: SDUPTHER

## 2018-07-29 RX ORDER — DOCUSATE SODIUM 100 MG/1
CAPSULE, LIQUID FILLED ORAL
Qty: 30 CAPSULE | Refills: 0 | Status: SHIPPED | OUTPATIENT
Start: 2018-07-29 | End: 2018-08-28 | Stop reason: SDUPTHER

## 2018-08-08 ENCOUNTER — OFFICE VISIT (OUTPATIENT)
Dept: PRIMARY CARE CLINIC | Age: 73
End: 2018-08-08
Payer: MEDICARE

## 2018-08-08 VITALS
WEIGHT: 182 LBS | HEIGHT: 63 IN | TEMPERATURE: 97.6 F | SYSTOLIC BLOOD PRESSURE: 130 MMHG | BODY MASS INDEX: 32.25 KG/M2 | DIASTOLIC BLOOD PRESSURE: 62 MMHG | OXYGEN SATURATION: 96 % | HEART RATE: 76 BPM | RESPIRATION RATE: 14 BRPM

## 2018-08-08 DIAGNOSIS — R42 VERTIGO: ICD-10-CM

## 2018-08-08 DIAGNOSIS — F41.9 ANXIETY: ICD-10-CM

## 2018-08-08 PROCEDURE — G8417 CALC BMI ABV UP PARAM F/U: HCPCS | Performed by: INTERNAL MEDICINE

## 2018-08-08 PROCEDURE — G8399 PT W/DXA RESULTS DOCUMENT: HCPCS | Performed by: INTERNAL MEDICINE

## 2018-08-08 PROCEDURE — 4040F PNEUMOC VAC/ADMIN/RCVD: CPT | Performed by: INTERNAL MEDICINE

## 2018-08-08 PROCEDURE — 3017F COLORECTAL CA SCREEN DOC REV: CPT | Performed by: INTERNAL MEDICINE

## 2018-08-08 PROCEDURE — 1101F PT FALLS ASSESS-DOCD LE1/YR: CPT | Performed by: INTERNAL MEDICINE

## 2018-08-08 PROCEDURE — 1036F TOBACCO NON-USER: CPT | Performed by: INTERNAL MEDICINE

## 2018-08-08 PROCEDURE — G8427 DOCREV CUR MEDS BY ELIG CLIN: HCPCS | Performed by: INTERNAL MEDICINE

## 2018-08-08 PROCEDURE — 99213 OFFICE O/P EST LOW 20 MIN: CPT | Performed by: INTERNAL MEDICINE

## 2018-08-08 PROCEDURE — 1123F ACP DISCUSS/DSCN MKR DOCD: CPT | Performed by: INTERNAL MEDICINE

## 2018-08-08 PROCEDURE — 1090F PRES/ABSN URINE INCON ASSESS: CPT | Performed by: INTERNAL MEDICINE

## 2018-08-08 RX ORDER — ALPRAZOLAM 0.5 MG/1
0.5 TABLET ORAL 3 TIMES DAILY PRN
Qty: 90 TABLET | Refills: 2 | Status: SHIPPED | OUTPATIENT
Start: 2018-08-08 | End: 2018-11-06

## 2018-08-08 RX ORDER — ALPRAZOLAM 0.5 MG/1
TABLET ORAL
Refills: 1 | COMMUNITY
Start: 2018-06-08 | End: 2018-10-15

## 2018-08-08 RX ORDER — MECLIZINE HYDROCHLORIDE 25 MG/1
25 TABLET ORAL 2 TIMES DAILY PRN
Qty: 60 TABLET | Refills: 5 | Status: SHIPPED | OUTPATIENT
Start: 2018-08-08 | End: 2019-10-08 | Stop reason: SDUPTHER

## 2018-08-08 NOTE — PROGRESS NOTES
Parker Naylor 74 y.o. female presents today with   Chief Complaint   Patient presents with    Dizziness     Pt here for follow up on Dizziness, Pt taking Meclizine, needs refill.  Anxiety     Pt here for follow up Anxiety, Pt taking Xanax, needs refill       Mental Health Problem   The primary symptoms include dysphoric mood. The primary symptoms do not include hallucinations. The current episode started more than 1 month ago. The onset of the illness is precipitated by emotional stress and a stressful event. The degree of incapacity that she is experiencing as a consequence of her illness is mild. Additional symptoms of the illness include anhedonia, insomnia and attention impairment. Additional symptoms of the illness do not include headaches or abdominal pain. She does not admit to suicidal ideas. Risk factors that are present for mental illness include a history of mental illness. Dizziness   This is a recurrent problem. The current episode started 1 to 4 weeks ago. The problem occurs intermittently. Associated symptoms include vertigo. Pertinent negatives include no abdominal pain, chest pain, chills, fever, headaches, joint swelling or rash. Past Medical History:   Diagnosis Date    Anxiety     Irritable bowel syndrome (IBS)     Migraine     Sciatica of right side 4/27/2017    Vertigo      Patient Active Problem List    Diagnosis Date Noted    Acute right-sided low back pain with right-sided sciatica 04/27/2017    Sciatica of right side 04/27/2017     Past Surgical History:   Procedure Laterality Date    HYSTERECTOMY       No family history on file.   Social History     Social History    Marital status:      Spouse name: N/A    Number of children: N/A    Years of education: N/A     Social History Main Topics    Smoking status: Never Smoker    Smokeless tobacco: Never Used    Alcohol use Yes      Comment: 1 daily    Drug use: No    Sexual activity: Not Asked     Other for Sleep or Anxiety for up to 90 days. .    Vertigo  -     meclizine (ANTIVERT) 25 MG tablet; Take 1 tablet by mouth 2 times daily as needed for Dizziness        No Follow-up on file.     Rita David MD

## 2018-08-13 ASSESSMENT — ENCOUNTER SYMPTOMS
ABDOMINAL DISTENTION: 0
PHOTOPHOBIA: 0
BLOOD IN STOOL: 0
CHOKING: 0
FACIAL SWELLING: 0
APNEA: 0
ABDOMINAL PAIN: 0

## 2018-08-28 RX ORDER — DOCUSATE SODIUM 100 MG/1
CAPSULE, LIQUID FILLED ORAL
Qty: 30 CAPSULE | Refills: 0 | Status: SHIPPED | OUTPATIENT
Start: 2018-08-28 | End: 2018-09-29 | Stop reason: SDUPTHER

## 2018-08-28 NOTE — TELEPHONE ENCOUNTER
Patient was last seen on 8-8-18  Last Prescribed 7-29-18    Medication is pending  Please approve or deny this request

## 2018-09-17 RX ORDER — CONJUGATED ESTROGENS 0.3 MG/1
TABLET, FILM COATED ORAL
Qty: 30 TABLET | Refills: 0 | Status: SHIPPED | OUTPATIENT
Start: 2018-09-17 | End: 2018-10-15

## 2018-10-01 RX ORDER — DOCUSATE SODIUM 100 MG/1
CAPSULE, LIQUID FILLED ORAL
Qty: 30 CAPSULE | Refills: 2 | Status: SHIPPED | OUTPATIENT
Start: 2018-10-01 | End: 2018-11-15 | Stop reason: SDUPTHER

## 2018-10-09 ENCOUNTER — TELEPHONE (OUTPATIENT)
Dept: PRIMARY CARE CLINIC | Age: 73
End: 2018-10-09

## 2018-10-09 DIAGNOSIS — R30.0 DYSURIA: Primary | ICD-10-CM

## 2018-10-09 RX ORDER — NITROFURANTOIN 25; 75 MG/1; MG/1
100 CAPSULE ORAL 2 TIMES DAILY
Qty: 20 CAPSULE | Refills: 0 | Status: SHIPPED | OUTPATIENT
Start: 2018-10-09 | End: 2018-10-15

## 2018-10-09 RX ORDER — PHENAZOPYRIDINE HYDROCHLORIDE 200 MG/1
200 TABLET, FILM COATED ORAL 3 TIMES DAILY PRN
Qty: 9 TABLET | Refills: 0 | Status: SHIPPED | OUTPATIENT
Start: 2018-10-09 | End: 2018-10-12

## 2018-10-13 ENCOUNTER — TELEPHONE (OUTPATIENT)
Dept: PRIMARY CARE CLINIC | Age: 73
End: 2018-10-13

## 2018-10-14 DIAGNOSIS — R30.0 DYSURIA: Primary | ICD-10-CM

## 2018-10-14 RX ORDER — SULFAMETHOXAZOLE AND TRIMETHOPRIM 800; 160 MG/1; MG/1
1 TABLET ORAL 2 TIMES DAILY
Qty: 6 TABLET | Refills: 0 | Status: SHIPPED | OUTPATIENT
Start: 2018-10-14 | End: 2018-10-16

## 2018-10-15 ENCOUNTER — OFFICE VISIT (OUTPATIENT)
Dept: PRIMARY CARE CLINIC | Age: 73
End: 2018-10-15
Payer: MEDICARE

## 2018-10-15 VITALS
DIASTOLIC BLOOD PRESSURE: 86 MMHG | TEMPERATURE: 98.3 F | RESPIRATION RATE: 16 BRPM | OXYGEN SATURATION: 93 % | WEIGHT: 177 LBS | HEIGHT: 63 IN | SYSTOLIC BLOOD PRESSURE: 130 MMHG | BODY MASS INDEX: 31.36 KG/M2 | HEART RATE: 90 BPM

## 2018-10-15 DIAGNOSIS — N39.0 URINARY TRACT INFECTION WITHOUT HEMATURIA, SITE UNSPECIFIED: Primary | ICD-10-CM

## 2018-10-15 DIAGNOSIS — R11.0 NAUSEA: ICD-10-CM

## 2018-10-15 LAB
APPEARANCE FLUID: ABNORMAL
BILIRUBIN, POC: ABNORMAL
BLOOD URINE, POC: ABNORMAL
CLARITY, POC: ABNORMAL
COLOR, POC: ABNORMAL
GLUCOSE URINE, POC: ABNORMAL
KETONES, POC: ABNORMAL
LEUKOCYTE EST, POC: ABNORMAL
NITRITE, POC: ABNORMAL
PH, POC: 6
PROTEIN, POC: ABNORMAL
SPECIFIC GRAVITY, POC: 1.01
UROBILINOGEN, POC: 1

## 2018-10-15 PROCEDURE — G8399 PT W/DXA RESULTS DOCUMENT: HCPCS | Performed by: INTERNAL MEDICINE

## 2018-10-15 PROCEDURE — 1123F ACP DISCUSS/DSCN MKR DOCD: CPT | Performed by: INTERNAL MEDICINE

## 2018-10-15 PROCEDURE — G8417 CALC BMI ABV UP PARAM F/U: HCPCS | Performed by: INTERNAL MEDICINE

## 2018-10-15 PROCEDURE — 81002 URINALYSIS NONAUTO W/O SCOPE: CPT | Performed by: INTERNAL MEDICINE

## 2018-10-15 PROCEDURE — 99213 OFFICE O/P EST LOW 20 MIN: CPT | Performed by: INTERNAL MEDICINE

## 2018-10-15 PROCEDURE — 1101F PT FALLS ASSESS-DOCD LE1/YR: CPT | Performed by: INTERNAL MEDICINE

## 2018-10-15 PROCEDURE — G8427 DOCREV CUR MEDS BY ELIG CLIN: HCPCS | Performed by: INTERNAL MEDICINE

## 2018-10-15 PROCEDURE — 4040F PNEUMOC VAC/ADMIN/RCVD: CPT | Performed by: INTERNAL MEDICINE

## 2018-10-15 PROCEDURE — G8484 FLU IMMUNIZE NO ADMIN: HCPCS | Performed by: INTERNAL MEDICINE

## 2018-10-15 PROCEDURE — 1090F PRES/ABSN URINE INCON ASSESS: CPT | Performed by: INTERNAL MEDICINE

## 2018-10-15 PROCEDURE — 1036F TOBACCO NON-USER: CPT | Performed by: INTERNAL MEDICINE

## 2018-10-15 PROCEDURE — 3017F COLORECTAL CA SCREEN DOC REV: CPT | Performed by: INTERNAL MEDICINE

## 2018-10-15 RX ORDER — ONDANSETRON 4 MG/1
4 TABLET, FILM COATED ORAL EVERY 8 HOURS PRN
Qty: 20 TABLET | Refills: 1 | Status: SHIPPED | OUTPATIENT
Start: 2018-10-15 | End: 2021-02-22

## 2018-10-16 ENCOUNTER — TELEPHONE (OUTPATIENT)
Dept: PRIMARY CARE CLINIC | Age: 73
End: 2018-10-16

## 2018-10-16 DIAGNOSIS — N39.0 URINARY TRACT INFECTION WITHOUT HEMATURIA, SITE UNSPECIFIED: ICD-10-CM

## 2018-10-17 RX ORDER — CONJUGATED ESTROGENS 0.3 MG/1
TABLET, FILM COATED ORAL
Qty: 30 TABLET | Refills: 0 | Status: SHIPPED | OUTPATIENT
Start: 2018-10-17 | End: 2018-11-23 | Stop reason: SDUPTHER

## 2018-10-18 LAB — URINE CULTURE, ROUTINE: NORMAL

## 2018-10-18 ASSESSMENT — ENCOUNTER SYMPTOMS
BLOOD IN STOOL: 0
APNEA: 0
NAUSEA: 1
ABDOMINAL DISTENTION: 0
FACIAL SWELLING: 0
PHOTOPHOBIA: 0
CHOKING: 0

## 2018-11-14 DIAGNOSIS — M85.80 OSTEOPENIA, UNSPECIFIED LOCATION: ICD-10-CM

## 2018-11-14 RX ORDER — RALOXIFENE HYDROCHLORIDE 60 MG/1
TABLET, FILM COATED ORAL
Qty: 90 TABLET | Refills: 2 | Status: SHIPPED | OUTPATIENT
Start: 2018-11-14 | End: 2019-10-10 | Stop reason: SDUPTHER

## 2018-11-15 ENCOUNTER — TELEPHONE (OUTPATIENT)
Dept: PRIMARY CARE CLINIC | Age: 73
End: 2018-11-15

## 2018-11-15 DIAGNOSIS — M85.80 OSTEOPENIA, UNSPECIFIED LOCATION: ICD-10-CM

## 2018-11-15 DIAGNOSIS — K59.00 CONSTIPATION, UNSPECIFIED CONSTIPATION TYPE: Primary | ICD-10-CM

## 2018-11-15 RX ORDER — DOCUSATE SODIUM 100 MG/1
200 CAPSULE, LIQUID FILLED ORAL NIGHTLY
Qty: 60 CAPSULE | Refills: 2 | Status: SHIPPED | OUTPATIENT
Start: 2018-11-15 | End: 2019-01-02 | Stop reason: SDUPTHER

## 2018-11-15 RX ORDER — RALOXIFENE HYDROCHLORIDE 60 MG/1
TABLET, FILM COATED ORAL
Qty: 30 TABLET | Refills: 0 | OUTPATIENT
Start: 2018-11-15

## 2018-11-25 RX ORDER — CONJUGATED ESTROGENS 0.3 MG/1
TABLET, FILM COATED ORAL
Qty: 30 TABLET | Refills: 2 | Status: SHIPPED | OUTPATIENT
Start: 2018-11-25 | End: 2019-03-02 | Stop reason: SDUPTHER

## 2018-11-25 RX ORDER — CONJUGATED ESTROGENS 0.62 MG/G
CREAM VAGINAL
Qty: 30 G | Refills: 2 | Status: SHIPPED | OUTPATIENT
Start: 2018-11-25 | End: 2021-04-01 | Stop reason: CLARIF

## 2018-12-19 DIAGNOSIS — J30.89 OTHER ALLERGIC RHINITIS: ICD-10-CM

## 2018-12-19 RX ORDER — IPRATROPIUM BROMIDE 42 UG/1
SPRAY, METERED NASAL
Qty: 15 ML | Refills: 2 | Status: SHIPPED | OUTPATIENT
Start: 2018-12-19 | End: 2018-12-19 | Stop reason: SDUPTHER

## 2018-12-19 RX ORDER — IPRATROPIUM BROMIDE 42 UG/1
SPRAY, METERED NASAL
Qty: 90 ML | Refills: 2 | Status: SHIPPED | OUTPATIENT
Start: 2018-12-19 | End: 2021-04-01 | Stop reason: CLARIF

## 2019-01-02 DIAGNOSIS — K59.00 CONSTIPATION, UNSPECIFIED CONSTIPATION TYPE: ICD-10-CM

## 2019-01-02 RX ORDER — DOCUSATE SODIUM 100 MG/1
200 CAPSULE, LIQUID FILLED ORAL NIGHTLY
Qty: 60 CAPSULE | Refills: 2 | Status: SHIPPED | OUTPATIENT
Start: 2019-01-02 | End: 2019-04-22 | Stop reason: SDUPTHER

## 2019-01-07 DIAGNOSIS — G47.00 INSOMNIA, UNSPECIFIED TYPE: ICD-10-CM

## 2019-01-07 DIAGNOSIS — F41.9 ANXIETY: Primary | ICD-10-CM

## 2019-01-07 RX ORDER — ALPRAZOLAM 0.5 MG/1
0.5 TABLET ORAL 3 TIMES DAILY PRN
Qty: 90 TABLET | Refills: 0 | Status: SHIPPED | OUTPATIENT
Start: 2019-01-07 | End: 2019-03-20 | Stop reason: SDUPTHER

## 2019-03-04 DIAGNOSIS — K58.0 IRRITABLE BOWEL SYNDROME WITH DIARRHEA: ICD-10-CM

## 2019-03-04 RX ORDER — CONJUGATED ESTROGENS 0.3 MG/1
TABLET, FILM COATED ORAL
Qty: 30 TABLET | Refills: 0 | Status: SHIPPED | OUTPATIENT
Start: 2019-03-04 | End: 2021-04-01 | Stop reason: CLARIF

## 2019-03-04 RX ORDER — DICYCLOMINE HYDROCHLORIDE 10 MG/1
20 CAPSULE ORAL 4 TIMES DAILY PRN
Qty: 120 CAPSULE | Refills: 5 | Status: SHIPPED | OUTPATIENT
Start: 2019-03-04 | End: 2020-06-17

## 2019-03-04 RX ORDER — CONJUGATED ESTROGENS 0.3 MG/1
TABLET, FILM COATED ORAL
Qty: 30 TABLET | Refills: 1 | Status: SHIPPED | OUTPATIENT
Start: 2019-03-04 | End: 2019-05-09 | Stop reason: SDUPTHER

## 2019-03-04 RX ORDER — CYCLOBENZAPRINE HCL 10 MG
10 TABLET ORAL EVERY 8 HOURS PRN
Qty: 60 TABLET | Refills: 0 | Status: SHIPPED | OUTPATIENT
Start: 2019-03-04 | End: 2019-04-29 | Stop reason: SDUPTHER

## 2019-03-05 RX ORDER — ALPRAZOLAM 0.5 MG/1
TABLET ORAL
Qty: 90 TABLET | Refills: 0 | OUTPATIENT
Start: 2019-03-05

## 2019-03-20 ENCOUNTER — OFFICE VISIT (OUTPATIENT)
Dept: PRIMARY CARE CLINIC | Age: 74
End: 2019-03-20
Payer: MEDICARE

## 2019-03-20 VITALS
HEART RATE: 68 BPM | SYSTOLIC BLOOD PRESSURE: 101 MMHG | OXYGEN SATURATION: 98 % | WEIGHT: 174 LBS | RESPIRATION RATE: 14 BRPM | TEMPERATURE: 97.5 F | HEIGHT: 63 IN | DIASTOLIC BLOOD PRESSURE: 68 MMHG | BODY MASS INDEX: 30.83 KG/M2

## 2019-03-20 DIAGNOSIS — G47.00 INSOMNIA, UNSPECIFIED TYPE: ICD-10-CM

## 2019-03-20 DIAGNOSIS — K58.2 IRRITABLE BOWEL SYNDROME WITH BOTH CONSTIPATION AND DIARRHEA: ICD-10-CM

## 2019-03-20 DIAGNOSIS — F41.9 ANXIETY: Primary | ICD-10-CM

## 2019-03-20 PROCEDURE — G8427 DOCREV CUR MEDS BY ELIG CLIN: HCPCS | Performed by: INTERNAL MEDICINE

## 2019-03-20 PROCEDURE — 1036F TOBACCO NON-USER: CPT | Performed by: INTERNAL MEDICINE

## 2019-03-20 PROCEDURE — 4040F PNEUMOC VAC/ADMIN/RCVD: CPT | Performed by: INTERNAL MEDICINE

## 2019-03-20 PROCEDURE — 99213 OFFICE O/P EST LOW 20 MIN: CPT | Performed by: INTERNAL MEDICINE

## 2019-03-20 PROCEDURE — 1101F PT FALLS ASSESS-DOCD LE1/YR: CPT | Performed by: INTERNAL MEDICINE

## 2019-03-20 PROCEDURE — G8417 CALC BMI ABV UP PARAM F/U: HCPCS | Performed by: INTERNAL MEDICINE

## 2019-03-20 PROCEDURE — G8484 FLU IMMUNIZE NO ADMIN: HCPCS | Performed by: INTERNAL MEDICINE

## 2019-03-20 PROCEDURE — 1123F ACP DISCUSS/DSCN MKR DOCD: CPT | Performed by: INTERNAL MEDICINE

## 2019-03-20 PROCEDURE — 3017F COLORECTAL CA SCREEN DOC REV: CPT | Performed by: INTERNAL MEDICINE

## 2019-03-20 PROCEDURE — 1090F PRES/ABSN URINE INCON ASSESS: CPT | Performed by: INTERNAL MEDICINE

## 2019-03-20 PROCEDURE — G8399 PT W/DXA RESULTS DOCUMENT: HCPCS | Performed by: INTERNAL MEDICINE

## 2019-03-20 RX ORDER — SERTRALINE HYDROCHLORIDE 25 MG/1
25 TABLET, FILM COATED ORAL DAILY
Qty: 30 TABLET | Refills: 5 | Status: SHIPPED | OUTPATIENT
Start: 2019-03-20 | End: 2019-03-21

## 2019-03-20 RX ORDER — ALPRAZOLAM 0.5 MG/1
0.5 TABLET ORAL 3 TIMES DAILY PRN
Qty: 90 TABLET | Refills: 2 | Status: SHIPPED | OUTPATIENT
Start: 2019-03-20 | End: 2019-04-19

## 2019-03-20 ASSESSMENT — PATIENT HEALTH QUESTIONNAIRE - PHQ9
SUM OF ALL RESPONSES TO PHQ QUESTIONS 1-9: 0
1. LITTLE INTEREST OR PLEASURE IN DOING THINGS: 0
SUM OF ALL RESPONSES TO PHQ QUESTIONS 1-9: 0
SUM OF ALL RESPONSES TO PHQ9 QUESTIONS 1 & 2: 0
2. FEELING DOWN, DEPRESSED OR HOPELESS: 0

## 2019-03-21 ENCOUNTER — TELEPHONE (OUTPATIENT)
Dept: PRIMARY CARE CLINIC | Age: 74
End: 2019-03-21

## 2019-03-24 ASSESSMENT — ENCOUNTER SYMPTOMS
APNEA: 0
CONSTIPATION: 1
BLOOD IN STOOL: 0
DIARRHEA: 1
FACIAL SWELLING: 0
ABDOMINAL DISTENTION: 0
PHOTOPHOBIA: 0
CHOKING: 0

## 2019-04-22 DIAGNOSIS — K59.00 CONSTIPATION, UNSPECIFIED CONSTIPATION TYPE: ICD-10-CM

## 2019-04-22 RX ORDER — DOCUSATE SODIUM 100 MG/1
200 CAPSULE, LIQUID FILLED ORAL NIGHTLY
Qty: 60 CAPSULE | Refills: 2 | Status: SHIPPED | OUTPATIENT
Start: 2019-04-22

## 2019-04-29 RX ORDER — CYCLOBENZAPRINE HCL 10 MG
10 TABLET ORAL EVERY 8 HOURS PRN
Qty: 60 TABLET | Refills: 0 | Status: SHIPPED | OUTPATIENT
Start: 2019-04-29 | End: 2019-06-11 | Stop reason: SDUPTHER

## 2019-04-29 NOTE — TELEPHONE ENCOUNTER
Patient was last seen on 3-20-19  Last Prescribed 3-4-19    Medication is pending  Please approve or deny this request

## 2019-05-09 ENCOUNTER — OFFICE VISIT (OUTPATIENT)
Dept: FAMILY MEDICINE CLINIC | Age: 74
End: 2019-05-09
Payer: MEDICARE

## 2019-05-09 VITALS
WEIGHT: 162 LBS | SYSTOLIC BLOOD PRESSURE: 122 MMHG | HEIGHT: 63 IN | RESPIRATION RATE: 16 BRPM | TEMPERATURE: 96.6 F | BODY MASS INDEX: 28.7 KG/M2 | DIASTOLIC BLOOD PRESSURE: 72 MMHG | HEART RATE: 89 BPM | OXYGEN SATURATION: 97 %

## 2019-05-09 DIAGNOSIS — N95.1 MENOPAUSAL SYMPTOM: ICD-10-CM

## 2019-05-09 DIAGNOSIS — K58.0 IRRITABLE BOWEL SYNDROME WITH DIARRHEA: Primary | ICD-10-CM

## 2019-05-09 PROCEDURE — 1123F ACP DISCUSS/DSCN MKR DOCD: CPT | Performed by: INTERNAL MEDICINE

## 2019-05-09 PROCEDURE — G8399 PT W/DXA RESULTS DOCUMENT: HCPCS | Performed by: INTERNAL MEDICINE

## 2019-05-09 PROCEDURE — 99213 OFFICE O/P EST LOW 20 MIN: CPT | Performed by: INTERNAL MEDICINE

## 2019-05-09 PROCEDURE — G8427 DOCREV CUR MEDS BY ELIG CLIN: HCPCS | Performed by: INTERNAL MEDICINE

## 2019-05-09 PROCEDURE — 1036F TOBACCO NON-USER: CPT | Performed by: INTERNAL MEDICINE

## 2019-05-09 PROCEDURE — G8417 CALC BMI ABV UP PARAM F/U: HCPCS | Performed by: INTERNAL MEDICINE

## 2019-05-09 PROCEDURE — 1090F PRES/ABSN URINE INCON ASSESS: CPT | Performed by: INTERNAL MEDICINE

## 2019-05-09 PROCEDURE — 4040F PNEUMOC VAC/ADMIN/RCVD: CPT | Performed by: INTERNAL MEDICINE

## 2019-05-09 NOTE — PROGRESS NOTES
Jeanmarie Tomlinson 66 y.o. female presents today with   Chief Complaint   Patient presents with    Medication Refill     PT. is here today to discuss a new medication, premarin. She is in need of a refill    Irritable Bowel Syndrome     Pt. is here today for IBS. She states she has lost 22lbs, unable to eat, diarrhea x 5 weeks, gas, bloating. She states she has been trying metamucil with mild relief.  Health Maintenance     Pt. denies [id number]       Diarrhea    This is a recurrent problem. The current episode started more than 1 year ago. The problem occurs 2 to 4 times per day. The problem has been waxing and waning. The stool consistency is described as watery. Associated symptoms include abdominal pain and bloating. Pertinent negatives include no chills or fever. The symptoms are aggravated by stress. Past Medical History:   Diagnosis Date    Anxiety     Irritable bowel syndrome with diarrhea     Migraine     Sciatica of right side 4/27/2017    Vertigo      Patient Active Problem List    Diagnosis Date Noted    Acute right-sided low back pain with right-sided sciatica 04/27/2017    Sciatica of right side 04/27/2017     Past Surgical History:   Procedure Laterality Date    HYSTERECTOMY       No family history on file.   Social History     Socioeconomic History    Marital status:      Spouse name: None    Number of children: None    Years of education: None    Highest education level: None   Occupational History    None   Social Needs    Financial resource strain: None    Food insecurity:     Worry: None     Inability: None    Transportation needs:     Medical: None     Non-medical: None   Tobacco Use    Smoking status: Never Smoker    Smokeless tobacco: Never Used   Substance and Sexual Activity    Alcohol use: Yes     Comment: 1 daily    Drug use: No    Sexual activity: None   Lifestyle    Physical activity:     Days per week: None     Minutes per session: None    Stress: None Relationships    Social connections:     Talks on phone: None     Gets together: None     Attends Congregation service: None     Active member of club or organization: None     Attends meetings of clubs or organizations: None     Relationship status: None    Intimate partner violence:     Fear of current or ex partner: None     Emotionally abused: None     Physically abused: None     Forced sexual activity: None   Other Topics Concern    None   Social History Narrative    None     Allergies   Allergen Reactions    Bactrim [Sulfamethoxazole-Trimethoprim] Shortness Of Breath    Albumen, Egg Diarrhea    Macrobid [Nitrofurantoin Macrocrystal]      SOB       Review of Systems   Constitutional: Negative for chills and fever. HENT: Negative for facial swelling and nosebleeds. Eyes: Negative for photophobia and visual disturbance. Respiratory: Negative for apnea and choking. Cardiovascular: Negative for chest pain and palpitations. Gastrointestinal: Positive for abdominal pain, bloating and diarrhea. Negative for abdominal distention and blood in stool. Genitourinary: Positive for menstrual problem. Negative for enuresis, hematuria and vaginal bleeding. Musculoskeletal: Negative for gait problem and joint swelling. Skin: Negative for rash. Neurological: Negative for syncope and speech difficulty. Hematological: Does not bruise/bleed easily. Psychiatric/Behavioral: Negative for hallucinations and suicidal ideas. Vitals:    05/09/19 1451   BP: 122/72   Pulse: 89   Resp: 16   Temp: 96.6 °F (35.9 °C)   TempSrc: Temporal   SpO2: 97%   Weight: 162 lb (73.5 kg)   Height: 5' 3\" (1.6 m)       Physical Exam   Constitutional: She is oriented to person, place, and time. She appears well-developed and well-nourished. HENT:   Head: Normocephalic and atraumatic. Eyes: Pupils are equal, round, and reactive to light. EOM are normal.   Neck: Normal range of motion. Neck supple.    Cardiovascular: Normal rate, regular rhythm and normal heart sounds. Pulmonary/Chest: Breath sounds normal. No respiratory distress. She has no wheezes. Abdominal: Soft. Bowel sounds are normal.   Musculoskeletal: Normal range of motion. Neurological: She is oriented to person, place, and time. Psychiatric: She has a normal mood and affect. Assessment/Plan  Lul Prieto was seen today for medication refill, irritable bowel syndrome and health maintenance. Diagnoses and all orders for this visit:    Irritable bowel syndrome with diarrhea    Menopausal symptom  -     estrogens, conjugated, (PREMARIN) 0.3 MG tablet; TAKE 1 TABLET BY MOUTH DAILY        No follow-ups on file.     Melody Fragoso MD

## 2019-05-14 ASSESSMENT — ENCOUNTER SYMPTOMS
ABDOMINAL DISTENTION: 0
PHOTOPHOBIA: 0
BLOOD IN STOOL: 0
ABDOMINAL PAIN: 1
DIARRHEA: 1
FACIAL SWELLING: 0
APNEA: 0
CHOKING: 0
BLOATING: 1

## 2019-06-11 RX ORDER — CYCLOBENZAPRINE HCL 10 MG
10 TABLET ORAL EVERY 8 HOURS PRN
Qty: 60 TABLET | Refills: 0 | Status: SHIPPED | OUTPATIENT
Start: 2019-06-11 | End: 2019-07-20 | Stop reason: SDUPTHER

## 2019-08-07 ENCOUNTER — TELEPHONE (OUTPATIENT)
Dept: ADMINISTRATIVE | Age: 74
End: 2019-08-07

## 2019-09-03 RX ORDER — CYCLOBENZAPRINE HCL 10 MG
10 TABLET ORAL EVERY 8 HOURS PRN
Qty: 60 TABLET | Refills: 0 | Status: SHIPPED | OUTPATIENT
Start: 2019-09-03 | End: 2019-10-08 | Stop reason: SDUPTHER

## 2019-10-08 DIAGNOSIS — R42 VERTIGO: ICD-10-CM

## 2019-10-08 RX ORDER — MECLIZINE HYDROCHLORIDE 25 MG/1
25 TABLET ORAL 2 TIMES DAILY PRN
Qty: 60 TABLET | Refills: 5 | Status: SHIPPED | OUTPATIENT
Start: 2019-10-08 | End: 2020-04-27

## 2019-10-08 RX ORDER — CYCLOBENZAPRINE HCL 10 MG
10 TABLET ORAL EVERY 8 HOURS PRN
Qty: 60 TABLET | Refills: 0 | Status: SHIPPED | OUTPATIENT
Start: 2019-10-08 | End: 2019-11-26 | Stop reason: SDUPTHER

## 2019-10-10 DIAGNOSIS — M85.80 OSTEOPENIA, UNSPECIFIED LOCATION: ICD-10-CM

## 2019-10-10 RX ORDER — RALOXIFENE HYDROCHLORIDE 60 MG/1
TABLET, FILM COATED ORAL
Qty: 90 TABLET | OUTPATIENT
Start: 2019-10-10

## 2019-10-10 RX ORDER — RALOXIFENE HYDROCHLORIDE 60 MG/1
TABLET, FILM COATED ORAL
Qty: 30 TABLET | Refills: 0 | Status: SHIPPED | OUTPATIENT
Start: 2019-10-10 | End: 2021-04-01 | Stop reason: CLARIF

## 2019-12-10 DIAGNOSIS — R42 VERTIGO: ICD-10-CM

## 2019-12-10 DIAGNOSIS — F41.9 ANXIETY: Primary | ICD-10-CM

## 2019-12-12 RX ORDER — ALPRAZOLAM 0.5 MG/1
TABLET ORAL
Qty: 90 TABLET | Refills: 0 | Status: SHIPPED | OUTPATIENT
Start: 2019-12-12 | End: 2020-03-05 | Stop reason: SDUPTHER

## 2019-12-30 RX ORDER — CYCLOBENZAPRINE HCL 10 MG
10 TABLET ORAL EVERY 8 HOURS PRN
Qty: 60 TABLET | Refills: 0 | Status: SHIPPED | OUTPATIENT
Start: 2019-12-30 | End: 2020-02-05

## 2020-02-05 RX ORDER — CYCLOBENZAPRINE HCL 10 MG
10 TABLET ORAL EVERY 8 HOURS PRN
Qty: 60 TABLET | Refills: 0 | Status: SHIPPED | OUTPATIENT
Start: 2020-02-05 | End: 2020-04-01 | Stop reason: SDUPTHER

## 2020-02-05 NOTE — TELEPHONE ENCOUNTER
Pharmacy requesting medication refill.  Please approve or deny this request.    Rx requested:  Requested Prescriptions     Pending Prescriptions Disp Refills    cyclobenzaprine (FLEXERIL) 10 MG tablet [Pharmacy Med Name: CYCLOBENZAPRINE 10MG TABLETS] 60 tablet 0     Sig: TAKE 1 TABLET BY MOUTH EVERY 8 HOURS AS NEEDED FOR MUSCLE SPASMS         Last Office Visit:   5/9/2019      Next Visit Date:  Future Appointments   Date Time Provider Sonali Patel   3/6/2020 10:15 AM Leonarda Almodovar MD [address] [place] [address]

## 2020-03-05 ENCOUNTER — OFFICE VISIT (OUTPATIENT)
Dept: PRIMARY CARE CLINIC | Age: 75
End: 2020-03-05
Payer: MEDICARE

## 2020-03-05 VITALS
TEMPERATURE: 98.4 F | WEIGHT: 167.6 LBS | RESPIRATION RATE: 18 BRPM | BODY MASS INDEX: 29.7 KG/M2 | OXYGEN SATURATION: 98 % | HEIGHT: 63 IN | HEART RATE: 74 BPM | DIASTOLIC BLOOD PRESSURE: 70 MMHG | SYSTOLIC BLOOD PRESSURE: 118 MMHG

## 2020-03-05 PROCEDURE — 1090F PRES/ABSN URINE INCON ASSESS: CPT | Performed by: INTERNAL MEDICINE

## 2020-03-05 PROCEDURE — 4040F PNEUMOC VAC/ADMIN/RCVD: CPT | Performed by: INTERNAL MEDICINE

## 2020-03-05 PROCEDURE — 99213 OFFICE O/P EST LOW 20 MIN: CPT | Performed by: INTERNAL MEDICINE

## 2020-03-05 PROCEDURE — G8399 PT W/DXA RESULTS DOCUMENT: HCPCS | Performed by: INTERNAL MEDICINE

## 2020-03-05 PROCEDURE — G8482 FLU IMMUNIZE ORDER/ADMIN: HCPCS | Performed by: INTERNAL MEDICINE

## 2020-03-05 PROCEDURE — 1036F TOBACCO NON-USER: CPT | Performed by: INTERNAL MEDICINE

## 2020-03-05 PROCEDURE — 1123F ACP DISCUSS/DSCN MKR DOCD: CPT | Performed by: INTERNAL MEDICINE

## 2020-03-05 PROCEDURE — G8427 DOCREV CUR MEDS BY ELIG CLIN: HCPCS | Performed by: INTERNAL MEDICINE

## 2020-03-05 PROCEDURE — G8417 CALC BMI ABV UP PARAM F/U: HCPCS | Performed by: INTERNAL MEDICINE

## 2020-03-05 RX ORDER — ALPRAZOLAM 0.5 MG/1
0.5 TABLET ORAL 3 TIMES DAILY PRN
Qty: 90 TABLET | Refills: 2 | Status: SHIPPED | OUTPATIENT
Start: 2020-03-05 | End: 2020-07-28 | Stop reason: SDUPTHER

## 2020-03-05 SDOH — ECONOMIC STABILITY: FOOD INSECURITY: WITHIN THE PAST 12 MONTHS, THE FOOD YOU BOUGHT JUST DIDN'T LAST AND YOU DIDN'T HAVE MONEY TO GET MORE.: PATIENT DECLINED

## 2020-03-05 SDOH — ECONOMIC STABILITY: TRANSPORTATION INSECURITY
IN THE PAST 12 MONTHS, HAS THE LACK OF TRANSPORTATION KEPT YOU FROM MEDICAL APPOINTMENTS OR FROM GETTING MEDICATIONS?: PATIENT DECLINED

## 2020-03-05 SDOH — ECONOMIC STABILITY: TRANSPORTATION INSECURITY
IN THE PAST 12 MONTHS, HAS LACK OF TRANSPORTATION KEPT YOU FROM MEETINGS, WORK, OR FROM GETTING THINGS NEEDED FOR DAILY LIVING?: PATIENT DECLINED

## 2020-03-05 SDOH — ECONOMIC STABILITY: FOOD INSECURITY: WITHIN THE PAST 12 MONTHS, YOU WORRIED THAT YOUR FOOD WOULD RUN OUT BEFORE YOU GOT MONEY TO BUY MORE.: PATIENT DECLINED

## 2020-03-05 SDOH — ECONOMIC STABILITY: INCOME INSECURITY: HOW HARD IS IT FOR YOU TO PAY FOR THE VERY BASICS LIKE FOOD, HOUSING, MEDICAL CARE, AND HEATING?: PATIENT DECLINED

## 2020-03-05 ASSESSMENT — PATIENT HEALTH QUESTIONNAIRE - PHQ9
SUM OF ALL RESPONSES TO PHQ9 QUESTIONS 1 & 2: 0
1. LITTLE INTEREST OR PLEASURE IN DOING THINGS: 0
2. FEELING DOWN, DEPRESSED OR HOPELESS: 0
SUM OF ALL RESPONSES TO PHQ QUESTIONS 1-9: 0
SUM OF ALL RESPONSES TO PHQ QUESTIONS 1-9: 0

## 2020-03-05 NOTE — PROGRESS NOTES
Tiffany Ramirez 66 y.o. female presents today with   Chief Complaint   Patient presents with    Medication Refill       Mental Health Problem   The primary symptoms include dysphoric mood and somatic symptoms. The primary symptoms do not include hallucinations. The current episode started more than 1 month ago. This is a recurrent problem. The onset of the illness is precipitated by emotional stress and a stressful event. The degree of incapacity that she is experiencing as a consequence of her illness is mild. Additional symptoms of the illness include anhedonia, agitation and distractible. She does not admit to suicidal ideas. She has not already injured self. Risk factors that are present for mental illness include a history of mental illness.  is better, still unable to talk. Past Medical History:   Diagnosis Date    Anxiety     Irritable bowel syndrome with diarrhea     Migraine     Sciatica of right side 4/27/2017    Vertigo      Patient Active Problem List    Diagnosis Date Noted    Acute right-sided low back pain with right-sided sciatica 04/27/2017    Sciatica of right side 04/27/2017     Past Surgical History:   Procedure Laterality Date    HYSTERECTOMY       No family history on file.   Social History     Socioeconomic History    Marital status:      Spouse name: None    Number of children: None    Years of education: None    Highest education level: None   Occupational History    None   Social Needs    Financial resource strain: Patient refused    Food insecurity     Worry: Patient refused     Inability: Patient refused    Transportation needs     Medical: Patient refused     Non-medical: Patient refused   Tobacco Use    Smoking status: Never Smoker    Smokeless tobacco: Never Used   Substance and Sexual Activity    Alcohol use: Yes     Comment: 1 daily    Drug use: No    Sexual activity: None   Lifestyle    Physical activity     Days per week: None Pupils are equal, round, and reactive to light. Neck:      Musculoskeletal: Normal range of motion and neck supple. Cardiovascular:      Rate and Rhythm: Normal rate and regular rhythm. Heart sounds: Normal heart sounds. Pulmonary:      Effort: No respiratory distress. Breath sounds: Normal breath sounds. No wheezing or rales. Abdominal:      General: Bowel sounds are normal.      Palpations: Abdomen is soft. Musculoskeletal: Normal range of motion. Skin:     Coloration: Skin is not jaundiced. Neurological:      Mental Status: She is alert and oriented to person, place, and time. Psychiatric:         Mood and Affect: Mood normal.       Assessment/Plan  Dortha Hodgkin was seen today for medication refill. Diagnoses and all orders for this visit:    Anxiety  -     ALPRAZolam (XANAX) 0.5 MG tablet; Take 1 tablet by mouth 3 times daily as needed for Sleep or Anxiety for up to 90 days. Vertigo  -     ALPRAZolam (XANAX) 0.5 MG tablet; Take 1 tablet by mouth 3 times daily as needed for Sleep or Anxiety for up to 90 days. No follow-ups on file.     Otilio Moraes MD

## 2020-03-11 ASSESSMENT — ENCOUNTER SYMPTOMS
CHOKING: 0
FACIAL SWELLING: 0
PHOTOPHOBIA: 0
APNEA: 0
ABDOMINAL DISTENTION: 0
BLOOD IN STOOL: 0

## 2020-04-01 RX ORDER — CYCLOBENZAPRINE HCL 10 MG
10 TABLET ORAL EVERY 8 HOURS PRN
Qty: 60 TABLET | Refills: 0 | Status: SHIPPED | OUTPATIENT
Start: 2020-04-01 | End: 2020-05-06

## 2020-04-27 RX ORDER — MECLIZINE HYDROCHLORIDE 25 MG/1
TABLET ORAL
Qty: 60 TABLET | Refills: 5 | Status: SHIPPED | OUTPATIENT
Start: 2020-04-27 | End: 2020-11-20 | Stop reason: SDUPTHER

## 2020-05-06 RX ORDER — CYCLOBENZAPRINE HCL 10 MG
10 TABLET ORAL EVERY 8 HOURS PRN
Qty: 60 TABLET | Refills: 0 | Status: SHIPPED | OUTPATIENT
Start: 2020-05-06 | End: 2020-06-06

## 2020-05-06 NOTE — TELEPHONE ENCOUNTER
requesting medication refill. Please approve or deny this request.    Rx requested:  Requested Prescriptions     Pending Prescriptions Disp Refills    cyclobenzaprine (FLEXERIL) 10 MG tablet [Pharmacy Med Name: CYCLOBENZAPRINE 10MG TABLETS] 60 tablet 0     Sig: TAKE 1 TABLET BY MOUTH EVERY 8 HOURS AS NEEDED FOR MUSCLE SPASMS         Last Office Visit:   3/5/2020      Next Visit Date:  No future appointments.

## 2020-05-12 ENCOUNTER — TELEPHONE (OUTPATIENT)
Dept: NEUROLOGY | Age: 75
End: 2020-05-12

## 2020-05-14 ENCOUNTER — VIRTUAL VISIT (OUTPATIENT)
Dept: PRIMARY CARE CLINIC | Age: 75
End: 2020-05-14
Payer: MEDICARE

## 2020-05-14 VITALS — TEMPERATURE: 96.8 F | BODY MASS INDEX: 28 KG/M2 | WEIGHT: 164 LBS | HEIGHT: 64 IN

## 2020-05-14 PROCEDURE — G0438 PPPS, INITIAL VISIT: HCPCS | Performed by: NURSE PRACTITIONER

## 2020-05-14 PROCEDURE — 4040F PNEUMOC VAC/ADMIN/RCVD: CPT | Performed by: NURSE PRACTITIONER

## 2020-05-14 PROCEDURE — 1123F ACP DISCUSS/DSCN MKR DOCD: CPT | Performed by: NURSE PRACTITIONER

## 2020-05-14 ASSESSMENT — LIFESTYLE VARIABLES
HOW OFTEN DURING THE LAST YEAR HAVE YOU HAD A FEELING OF GUILT OR REMORSE AFTER DRINKING: 0
HAVE YOU OR SOMEONE ELSE BEEN INJURED AS A RESULT OF YOUR DRINKING: 0
HOW MANY STANDARD DRINKS CONTAINING ALCOHOL DO YOU HAVE ON A TYPICAL DAY: 0
HOW OFTEN DURING THE LAST YEAR HAVE YOU FAILED TO DO WHAT WAS NORMALLY EXPECTED FROM YOU BECAUSE OF DRINKING: 0
AUDIT-C TOTAL SCORE: 1
HOW OFTEN DO YOU HAVE SIX OR MORE DRINKS ON ONE OCCASION: 0
HOW OFTEN DURING THE LAST YEAR HAVE YOU NEEDED AN ALCOHOLIC DRINK FIRST THING IN THE MORNING TO GET YOURSELF GOING AFTER A NIGHT OF HEAVY DRINKING: 0
HAS A RELATIVE, FRIEND, DOCTOR, OR ANOTHER HEALTH PROFESSIONAL EXPRESSED CONCERN ABOUT YOUR DRINKING OR SUGGESTED YOU CUT DOWN: 0
HOW OFTEN DO YOU HAVE A DRINK CONTAINING ALCOHOL: 1
HOW OFTEN DURING THE LAST YEAR HAVE YOU BEEN UNABLE TO REMEMBER WHAT HAPPENED THE NIGHT BEFORE BECAUSE YOU HAD BEEN DRINKING: 0
HOW OFTEN DURING THE LAST YEAR HAVE YOU FOUND THAT YOU WERE NOT ABLE TO STOP DRINKING ONCE YOU HAD STARTED: 0
AUDIT TOTAL SCORE: 1

## 2020-05-14 ASSESSMENT — PATIENT HEALTH QUESTIONNAIRE - PHQ9
SUM OF ALL RESPONSES TO PHQ QUESTIONS 1-9: 0
SUM OF ALL RESPONSES TO PHQ QUESTIONS 1-9: 0

## 2020-05-14 NOTE — PROGRESS NOTES
Medicare Annual Wellness Visit  Are Name: Mindy David Date: 2020   MRN: [id number] Sex: Female   Age: 66 y.o. Ethnicity: Non-/Non    : 1945 Race: Goldy Bernstein is here for Medicare AWV    Screenings for behavioral, psychosocial and functional/safety risks, and cognitive dysfunction are all negative except as indicated below. These results, as well as other patient data from the [address] form, are documented in Flowsheets linked to this Encounter. Allergies   Allergen Reactions    Bactrim [Sulfamethoxazole-Trimethoprim] Shortness Of Breath    Albumen, Egg Diarrhea    Macrobid [Nitrofurantoin Macrocrystal]      SOB         Prior to Visit Medications    Medication Sig Taking? Authorizing Provider   estrogens, conjugated, (PREMARIN) 0.3 MG tablet TAKE 1 TABLET BY MOUTH DAILY  [facility], MD   cyclobenzaprine (FLEXERIL) 10 MG tablet TAKE 1 TABLET BY MOUTH EVERY 8 HOURS AS NEEDED FOR MUSCLE SPASMS  [facility], MD   meclizine (ANTIVERT) 25 MG tablet TAKE 1 TABLET BY MOUTH TWICE DAILY AS NEEDED FOR DIZZINESS  [facility], MD   ALPRAZolam Kera Alfred) 0.5 MG tablet Take 1 tablet by mouth 3 times daily as needed for Sleep or Anxiety for up to 90 days.   [facility], MD   raloxifene (EVISTA) 60 MG tablet TAKE 1 TABLET BY MOUTH EVERY DAY  Melissa Baker MD   docusate sodium (DOK) 100 MG capsule Take 2 capsules by mouth nightly  [facility], MD   PREMARIN 0.3 MG tablet TAKE 1 TABLET BY MOUTH DAILY  Princess Owusu MD   dicyclomine (BENTYL) 10 MG capsule Take 2 capsules by mouth 4 times daily as needed (prn)  [facility] MD KATHRYN   ipratropium (ATROVENT) 0.06 % nasal spray USE 2 SPRAYS IN EACH NOSTRIL THREE TIMES DAILY  [facility] MD KATHRYN   PREMARIN 0.625 MG/GM vaginal cream PLACE 0.5 GRAM VAGINALLY DAILY  Princess Owusu MD   ondansetron (ZOFRAN) 4 MG tablet Take 1 tablet by mouth every 8 hours as needed for Nausea or Vomiting  Patient not taking: Reported on 3/5/2020  Cinthia Mcneil MD   raloxifene (EVISTA) 60 MG tablet TAKE 1 TABLET BY MOUTH DAILY  Kiara Urbina MD   conjugated estrogens (PREMARIN) 0.625 MG/GM vaginal cream PLACE 0.5 GRAM VAGINALLY Rios Palencia MD   chlordiazePOXIDE-clidinium (LIBRAX) 5-2.5 MG per capsule Take 1 capsule by mouth 2 times daily as needed (as needed)  Cinthia Mcneil MD   pantoprazole (PROTONIX) 40 MG tablet TAKE ONE TABLET BY MOUTH TWICE DAILY  Cinthia Mcneil MD   TOVIAZ 4 MG TB24 ER tablet TAKE 1 TABLET BY MOUTH DAILY  Cinthia Mcneil MD   ipratropium (ATROVENT) 0.06 % nasal spray USE 2 SPRAYS IN EACH NOSTRIL THREE TIMES DAILY  Cinthia Mcneil MD   Azelastine-Fluticasone [facility]) 137-50 MCG/ACT SUSP 2 sprays by Nasal route 2 times daily as needed  Cinthia Mcneil MD   glycopyrrolate (ROBINUL) 2 MG tablet Take 1 tablet by mouth 2 times daily  Cinthia Mcneil MD   fluticasone (FLONASE) 50 MCG/ACT nasal spray USE 1 SPRAY IN EACH NOSTRIL DAILY  Cinthia Mcneil MD   Cetirizine HCl (ZYRTEC ALLERGY) 10 MG CAPS Take  by mouth daily. Historical Provider, MD   Naproxen Sodium (ALEVE) 220 MG CAPS Take  by mouth as needed. Historical Provider, MD         Past Medical History:   Diagnosis Date    Anxiety     Irritable bowel syndrome with diarrhea     Migraine     Sciatica of right side 4/27/2017    Vertigo        Past Surgical History:   Procedure Laterality Date    HYSTERECTOMY         No family history on file. CareTeam (Including outside providers/suppliers regularly involved in providing care):   Patient Care Team:  Cinthia Mcneil MD as PCP - General (Internal Medicine)  Cinthia Mcneil MD as PCP [facility] Empaneled Provider  Anderson Salcedo MD    [facility] Readings from Last 3 Encounters:   05/14/20 164 lb (74.4 kg)   03/05/20 167 lb 9.6 oz (76 kg)   05/09/19 162 lb (73.5 kg)     Vitals:    05/14/20 1100   Temp: 96.8 °F (36 °C)   Weight: 164 lb (74.4 kg)   Height: 5' 4\" (1.626 m)     Body mass index is 28.15 kg/m².     Based upon direct observation of

## 2020-05-14 NOTE — PATIENT INSTRUCTIONS
cholesterol, this type of cholesterol actually carries cholesterol away from your arteries and may, therefore, help lower your risk of having a heart attack. You want this level to be high (ideally greater than 60). It is a risk to have a level less than 40. You can raise this good cholesterol by eating olive oil, canola oil, avocados, or nuts. Exercise raises this level, too. Fat    Fat is calorie dense and packs a lot of calories into a small amount of food. Even though fats should be limited due to their high calorie content, not all fats are bad. In fact, some fats are quite healthful. Fat can be broken down into four main types. The good-for-you fats are:   Monounsaturated fat  found in oils such as olive and canola, avocados, and nuts and natural nut butters; can decrease cholesterol levels, while keeping levels of HDL cholesterol high   Polyunsaturated fat  found in oils such as safflower, sunflower, soybean, corn, and sesame; can decrease total cholesterol and LDL cholesterol   Omega-3 fatty acids  particularly those found in fatty fish (such as salmon, trout, tuna, mackerel, herring, and sardines); can decrease risk of arrhythmias, decrease triglyceride levels, and slightly lower blood pressure   The fats that you want to limit are:   Saturated fat  found in animal products, many fast foods, and a few vegetables; increases total blood cholesterol, including LDL levels   Animal fats that are saturated include: butter, lard, whole-milk dairy products, meat fat, and poultry skin   Vegetable fats that are saturated include: hydrogenated shortening, palm oil, coconut oil, cocoa butter   Hydrogenated or trans fat  found in margarine and vegetable shortening, most shelf stable snack foods, and fried foods; increases LDL and decreases HDL     It is generally recommended that you limit your total fat for the day to less than 30% of your total calories.  If you follow an 1800-calorie heart healthy diet, for and cholesterol amounts. For products low in sodium, look for sodium free, very low sodium, low sodium, no added salt, and unsalted   Skip the salt when cooking or at the table; if food needs more flavor, get creative and try out different herbs and spices. Garlic and onion also add substantial flavor to foods. Trim any visible fat off meat and poultry before cooking, and drain the fat off after rainey. Use cooking methods that require little or no added fat, such as grilling, boiling, baking, poaching, broiling, roasting, steaming, stir-frying, and sauting. Avoid fast food and convenience food. They tend to be high in saturated and trans fat and have a lot of added salt. Talk to a registered dietitian for individualized diet advice. Last Reviewed: March 2011 Rocco Barney MS, MPH, RD   Updated: 3/29/2011   ·     Heart-Healthy Diet   Sodium, Fat, and Cholesterol Controlled Diet       What Is a Heart Healthy Diet? A heart-healthy diet is one that limits sodium , certain types of fat , and cholesterol . This type of diet is recommended for:   People with any form of cardiovascular disease (eg, coronary heart disease , peripheral vascular disease , previous heart attack , previous stroke )   People with risk factors for cardiovascular disease, such as high blood pressure , high cholesterol , or diabetes   Anyone who wants to lower their risk of developing cardiovascular disease   Sodium    Sodium is a mineral found in many foods. In general, most people consume much more sodium than they need. Diets high in sodium can increase blood pressure and lead to edema (water retention). On a heart-healthy diet, you should consume no more than 2,300 mg (milligrams) of sodium per dayabout the amount in one teaspoon of table salt. The foods highest in sodium include table salt (about 50% sodium), processed foods, convenience foods, and preserved foods.    Cholesterol    Cholesterol is a fat-like, waxy substance in your blood. Our bodies make some cholesterol. It is also found in animal products, with the highest amounts in fatty meat, egg yolks, whole milk, cheese, shellfish, and organ meats. On a heart-healthy diet, you should limit your cholesterol intake to less than 200 mg per day. It is normal and important to have some cholesterol in your bloodstream. But too much cholesterol can cause plaque to build up within your arteries, which can eventually lead to a heart attack or stroke. The two types of cholesterol that are most commonly referred to are:   Low-density lipoprotein (LDL) cholesterol  Also known as bad cholesterol, this is the cholesterol that tends to build up along your arteries. Bad cholesterol levels are increased by eating fats that are saturated or hydrogenated. Optimal level of this cholesterol is less than 100. Over 130 starts to get risky for heart disease. High-density lipoprotein (HDL) cholesterol  Also known as good cholesterol, this type of cholesterol actually carries cholesterol away from your arteries and may, therefore, help lower your risk of having a heart attack. You want this level to be high (ideally greater than 60). It is a risk to have a level less than 40. You can raise this good cholesterol by eating olive oil, canola oil, avocados, or nuts. Exercise raises this level, too. Fat    Fat is calorie dense and packs a lot of calories into a small amount of food. Even though fats should be limited due to their high calorie content, not all fats are bad. In fact, some fats are quite healthful. Fat can be broken down into four main types.    The good-for-you fats are:   Monounsaturated fat  found in oils such as olive and canola, avocados, and nuts and natural nut butters; can decrease cholesterol levels, while keeping levels of HDL cholesterol high   Polyunsaturated fat  found in oils such as safflower, sunflower, soybean, corn, and sesame; can decrease total cholesterol and LDL cholesterol   Omega-3 fatty acids  particularly those found in fatty fish (such as salmon, trout, tuna, mackerel, herring, and sardines); can decrease risk of arrhythmias, decrease triglyceride levels, and slightly lower blood pressure   The fats that you want to limit are:   Saturated fat  found in animal products, many fast foods, and a few vegetables; increases total blood cholesterol, including LDL levels   Animal fats that are saturated include: butter, lard, whole-milk dairy products, meat fat, and poultry skin   Vegetable fats that are saturated include: hydrogenated shortening, palm oil, coconut oil, cocoa butter   Hydrogenated or trans fat  found in margarine and vegetable shortening, most shelf stable snack foods, and fried foods; increases LDL and decreases HDL     It is generally recommended that you limit your total fat for the day to less than 30% of your total calories. If you follow an 1800-calorie heart healthy diet, for example, this would mean 60 grams of fat or less per day. Saturated fat and trans fat in your diet raises your blood cholesterol the most, much more than dietary cholesterol does. For this reason, on a heart-healthy diet, less than 7% of your calories should come from saturated fat and ideally 0% from trans fat. On an 1800-calorie diet, this translates into less than 14 grams of saturated fat per day, leaving 46 grams of fat to come from mono- and polyunsaturated fats.    Food Choices on a Heart Healthy Diet   Food Category   Foods Recommended   Foods to Avoid   Grains   Breads and rolls without salted tops Most dry and cooked cereals Unsalted crackers and breadsticks Low-sodium or homemade breadcrumbs or stuffing All rice and pastas   Breads, rolls, and crackers with salted tops High-fat baked goods (eg, muffins, donuts, pastries) Quick breads, self-rising flour, and biscuit mixes Regular bread crumbs Instant hot cereals Commercially prepared rice, pasta, or stuffing mixes people in the world. The percentage of obese people in the [place] has risen steadily. Many people find that although they initially lose weight by dieting, they quickly regain the weight after the diet ends. Because it so hard to keep weight off over time, it is important to have as much information and support as possible before starting a diet. You are most likely to be successful in losing weight and keeping it off when you believe that your body weight can be controlled. STARTING A WEIGHT LOSS PROGRAM -- Some people like to talk to their doctor or nurse to get help choosing the best plan, monitoring progress, and getting advice and support along the way. To know what treatment (or combination of treatments) will work best, determine your body mass index (BMI) and waist circumference (measurement). The BMI is calculated from your height and weight. A person with a BMI between 25 and 29.9 is considered overweight   A person with a BMI of 30 or greater is considered to be obese  A waist circumference greater than 35 inches (88 cm) in women and 40 inches (102 cm) in men increases the risk of obesity-related complications, such as heart disease and diabetes. People who are obese and who have a larger waist size may need more aggressive weight loss treatment than others. Talk to your doctor or nurse for advice. Types of treatment -- Based on your measurements and your medical history, your doctor or nurse can determine what combination of weight loss treatments would work best for you. Treatments may include changes in lifestyle, exercise, dieting, and, in some cases, weight loss medicines or weight loss surgery. Weight loss surgery, also called bariatric surgery, is reserved for people with severe obesity who have not responded to other weight loss treatments.    SETTING A WEIGHT LOSS GOAL -- It is important to set a realistic weight loss goal. Your first goal should be to avoid gaining more weight and have other medical problems, such as diabetes, high cholesterol, or high blood pressure  Two weight loss medicines are approved in the [place] for long-term use. These are sibutramine and orlistat. Other weight loss medicines (phentermine, diethylpropion) are available but are only approved for short-term use (up to 12 weeks). Sibutramine -- Sibutramine (Meridia®, Reductil®) is a medicine that reduces your appetite. In people who take the medicine for one year, the average weight loss is 10 percent of the initial body weight (5 percent more than those who took a placebo treatment). Side effects of sibutramine include insomnia, dry mouth, and constipation. Increases in blood pressure can occur. Therefore, blood pressure is usually monitored during treatment. There is no evidence that sibutramine causes heart or lung problems (like dexfenfluramine and fenfluramine (Phen/Fen)). However, experts agree that sibutramine should not used by people with coronary heart disease, heart failure, uncontrolled hypertension, stroke, irregular heart rhythms, or peripheral vascular disease (poor circulation in the legs). Orlistat -- Orlistat (Xenical® 120 mg capsules) is a medicine that reduces the amount of fat your body absorbs from the foods you eat. A lower-dose version is now available without a prescription (Juwan® 60 mg capsules) in many countries, including the [place]. The medicine is recommended three times per day, taken with a meal; you can skip a dose if you skip a meal or if the meal contains no fat. After one year of treatment with orlistat, the average weight loss is approximately 8 to 10 percent of initial body weight (4 percent more than in those who took a placebo). Cholesterol levels often improve, and blood pressure sometimes falls. In people with diabetes, orlistat may help control blood sugar levels.   Side effects occur in 15 to 10 percent of people and may include stomach cramps, gas, diarrhea, following:  Severe obesity (body mass index above 40) (calculator 1 and calculator 2) who have not responded to diet, exercise, or weight loss medicines   Body mass index between 35 and 40, along with a serious medical problem (including diabetes, severe joint pain, or sleep apnea) that would improve with weight loss  You should be sure that you understand the potential risks and benefits of weight loss surgery. You must be motivated and willing to make lifelong changes in how you eat to reach and maintain a healthier weight after surgery. You must also be realistic about weight loss after surgery (see 'Effectiveness of weight loss surgery' below). PREPARING FOR WEIGHT LOSS SURGERY -- Most people who have weight loss surgery will meet with several specialists before surgery is scheduled. This often includes a dietitian, mental health counselor, a doctor who specializes in care of obese people, and a surgeon who performs weight loss surgery (bariatric surgeon). You may need to work with these providers for several weeks or months before surgery. The nutritionist will explain what and how much you will be able to eat after surgery. You may also need to lose a small amount of weight before surgery. The mental health specialist will help you to cope with stress and other factors that can make it harder to lose weight or trigger you to eat   The medical doctor will determine whether you need other tests, counseling, or treatment before surgery. He or she might also help you begin a medical weight loss program so that you can lose some weight before surgery. The bariatric surgeon will meet with you to discuss the surgeries available to treat obesity. He or she will also make sure you are a good candidate for surgery. TYPES OF WEIGHT LOSS SURGERY -- There are several types of weight loss surgeries, the most common being lap banding, gastric bypass, and gastric sleeve.   Lap banding -- Laparoscopic adjustable gastric stretch, allowing you to eat more food. The body absorbs fewer calories, since food bypasses most of the stomach as well as the upper small intestine. This new arrangement seems to decrease your appetite and change how you break down foods by changing the release of various hormones. Gastric bypass can be performed as open surgery (through an incision on the abdomen) or laparoscopically, which uses smaller incisions and smaller instruments. Both the laparoscopic and open techniques have risks and benefits. You and your surgeon should work together to decide which surgery, if any, is right for you. Gastric bypass has a high success rate, and people lose an average of 62 to 68 percent of their excess body weight in the first year. Weight loss typically levels off after one to two years, with an overall excess weight loss between 50 and 75 percent. For a person who is 120 pounds overweight, an average of 60 to 90 pounds of weight loss would be expected. Gastric sleeve -- Gastric sleeve, also known as sleeve gastrectomy, is a surgery that reduces the size of the stomach and makes it into a narrow tube (figure 3). The new stomach is much smaller and produces less of the hormone (ghrelin) that causes hunger, helping you feel satisfied with less food. Sleeve gastrectomy is safer than gastric bypass because the intestines are not rearranged, and there is less chance of malnutrition. It also appears to control hunger better than lap banding. It might be safer than the lap banding because no foreign materials are used. The gastric sleeve has a good success rate, and people lose an average of 33 percent of their excess body weight in the first year. For a person who is 120 pounds overweight, this would mean losing about 40 pounds in the first year. WEIGHT LOSS SURGERY COMPLICATIONS -- A variety of complications can occur with weight loss surgery.  The risks of surgery depend upon which surgery you have and any medical ([email or web address])  ·     High-Fiber Diet     What Is Fiber? Dietary fiber is a form of carbohydrate found in plants that cannot be digested by humans. All plants contain fiber, including fruits, vegetables, grains, and legumes. Fiber is often classified into two categories: soluble and insoluble. Soluble fiber draws water into the bowel and can help slow digestion. Examples of foods that are high in soluble fiber include oatmeal, oat bran, barley, legumes (eg, beans and peas), apples, and strawberries. Insoluble fiber speeds digestion and can add bulk to the stool. Examples of foods that are high in insoluble fiber include whole-wheat products, wheat bran, cauliflower, green beans, and potatoes. Why Follow a High-Fiber Diet? A high-fiber diet is often recommended to prevent and treat constipation , hemorrhoids , diverticulitis , and irritable bowel syndrome . Eating a high-fiber diet can also help improve your cholesterol levels, lower your risk of coronary heart disease , reduce your risk of type 2 diabetes , and lower your weight. For people with type 1 or 2 diabetes, a high-fiber diet can also help stabilize blood sugar levels. How Much Fiber Should I Eat? A high-fiber diet should contain  20-35 grams  of fiber a day. This is actually the amount recommended for the general adult population; however, most Americans eat only 15 grams of fiber per day. Digestion of Fiber   Eating a higher fiber diet than usual can take some getting used to by your body's digestive system. To avoid the side effects of sudden increases in dietary fiber (eg, gas, cramping, bloating, and diarrhea), increase fiber gradually and be sure to drink plenty of fluids every day. Tips for Increasing Fiber Intake   Whenever possible, choose whole grains over refined grains (eg, brown rice instead of white rice, whole-wheat bread instead of white bread).     Include a variety of grains in your diet, such as wheat, rye, All beans and peas, especially Garbanzo beans, kidney beans, lentils, lima beans, split peas, and silva beans All nuts and seeds, especially almonds, peanuts, [place] nuts, cashews, peanut butter, walnuts, sesame and sunflower seeds All meat, poultry, fish, and eggs   Increase fiber in meat dishes by adding silva beans, kidney beans, black-eyed peas, bran, or oatmeal. If you are following a low-fat diet, use nuts and seeds only in moderation. Fats and Oils   All in moderation   Fats and oils do not provide fiber   Snacks, Sweets, and Condiments   Fruit Nuts Popcorn, whole-wheat pretzels, or trail mix made with dried fruits, nuts, and seeds Cakes, breads, and cookies made with oatmeal or whole-wheat flour   Most snack foods do not provide much fiber. Choose snacks with at least 2 grams of fiber per serving. Last Reviewed: March 2011 Shelia Perry MS, MPH, RD   Updated: 3/29/2011   ·   Patient Education        Well Visit, Over 72: Care Instructions  Your Care Instructions    Physical exams can help you stay healthy. Your doctor has checked your overall health and may have suggested ways to take good care of yourself. He or she also may have recommended tests. At home, you can help prevent illness with healthy eating, regular exercise, and other steps. Follow-up care is a key part of your treatment and safety. Be sure to make and go to all appointments, and call your doctor if you are having problems. It's also a good idea to know your test results and keep a list of the medicines you take. How can you care for yourself at home? Reach and stay at a healthy weight. This will lower your risk for many problems, such as obesity, diabetes, heart disease, and high blood pressure. Get at least 30 minutes of exercise on most days of the week. Walking is a good choice. You also may want to do other activities, such as running, swimming, cycling, or playing tennis or team sports. Do not smoke.  Smoking can make health seeds. One egg, 1 tablespoon of peanut butter, ½ ounce nuts or seeds, or ¼ cup of cooked beans equals 1 ounce of meat. Learn how to read food labels for serving sizes and ingredients. Fast-food and convenience-food meals often contain few or no fruits or vegetables. Make sure you eat some fruits and vegetables to make the meal more nutritious. Look at your food diary. For each food group, add up what you have eaten and then divide the total by the number of days. This will give you an idea of how much you are eating from each food group. See if you can find some ways to change your diet to make it more healthy. Start small  Do not try to make dramatic changes to your diet all at once. You might feel that you are missing out on your favorite foods and then be more likely to fail. Start slowly, and gradually change your habits. Try some of the following:  Use whole wheat bread instead of white bread. Use nonfat or low-fat milk instead of whole milk. Eat brown rice instead of white rice, and eat whole wheat pasta instead of white-flour pasta. Try low-fat cheeses and low-fat yogurt. Add more fruits and vegetables to meals and have them for snacks. Add lettuce, tomato, cucumber, and onion to sandwiches. Add fruit to yogurt and cereal.  Enjoy food  You can still eat your favorite foods. You just may need to eat less of them. If your favorite foods are high in fat, salt, and sugar, limit how often you eat them, but do not cut them out entirely. Eat a wide variety of foods. Make healthy choices when eating out  The type of restaurant you choose can help you make healthy choices. Even fast-food chains are now offering more low-fat or healthier choices on the menu. Choose smaller portions, or take half of your meal home. When eating out, try:  A veggie pizza with a whole wheat crust or grilled chicken (instead of sausage or pepperoni).   Pasta with roasted vegetables, grilled chicken, or marinara sauce instead of cream sauce. A vegetable wrap or grilled chicken wrap. Broiled or poached food instead of fried or breaded items. Make healthy choices easy  Buy packaged, prewashed, ready-to-eat fresh vegetables and fruits, such as baby carrots, salad mixes, and chopped or shredded broccoli and cauliflower. Buy packaged, presliced fruits, such as melon or pineapple. Choose 100% fruit or vegetable juice instead of soda. Limit juice intake to 4 to 6 oz (½ to ¾ cup) a day. Blend low-fat yogurt, fruit juice, and canned or frozen fruit to make a smoothie for breakfast or a snack. Where can you learn more? Go to https://[employer]. org and sign in to your [employer] account. Enter X282 in the [employer] box to learn more about \"Eating Healthy Foods: Care Instructions. \"     If you do not have an account, please click on the \"Sign Up Now\" link. Current as of: August 21, 2019Content Version: 12.4  © 1281-8856 Healthwise, Incorporated. Care instructions adapted under license by [facility] [place] ([facility]). If you have questions about a medical condition or this instruction, always ask your healthcare professional. [place] [address] any warranty or liability for your use of this information. Patient Education        Learning About Physical Activity  What is physical activity? Physical activity is any kind of activity that gets your body moving. The types of physical activity that can help you get fit and stay healthy include:  Aerobic or \"cardio\" activities that make your heart beat faster and make you breathe harder, such as brisk walking, riding a bike, or running. Aerobic activities strengthen your heart and lungs and build up your endurance. Strength training activities that make your muscles work against, or \"resist,\" something, such as lifting weights or doing push-ups. These activities help tone and strengthen your muscles.   Stretches that allow you to move your joints and muscles \"resist,\" something can help you get stronger. For example, you can:  Do push-ups or sit-ups, which use your own body weight as resistance. Lift weights or dumbbells or use stretch bands at home or in a gym or community center. Stretch your muscles often. Stretching will help you as you become more active. It can help you stay flexible, loosen tight muscles, and avoid injury. It can also help improve your balance and posture and can be a great way to relax. Be sure to stretch the muscles you'll be using when you work out. It's best to warm your muscles slightly before you stretch them. Walk or do some other light aerobic activity for a few minutes, and then start stretching. When you stretch your muscles:  Do it slowly. Stretching is not about going fast or making sudden movements. Don't push or bounce during a stretch. Hold each stretch for at least 15 to 30 seconds, if you can. You should feel a stretch in the muscle, but not pain. Breathe out as you do the stretch. Then breathe in as you hold the stretch. Don't hold your breath. If you're worried about how more activity might affect your health, have a checkup before you start. Follow any special advice your doctor gives you for getting a smart start. Where can you learn more? Go to https://Covocativepee994.Fliptop. org and sign in to your Index account. Enter W610 in the [employer] box to learn more about \"Learning About Physical Activity. \"     If you do not have an account, please click on the \"Sign Up Now\" link. Current as of: May 5, 2019Content Version: 12.4  © 3420-8199 Healthwise, Incorporated. Care instructions adapted under license by [facility] ([facility]). If you have questions about a medical condition or this instruction, always ask your healthcare professional. [address] any warranty or liability for your use of this information.

## 2020-05-22 PROBLEM — H81.13 BENIGN PAROXYSMAL VERTIGO OF BOTH EARS: Status: ACTIVE | Noted: 2020-05-22

## 2020-05-22 PROBLEM — S82.831A CLOSED FRACTURE OF RIGHT DISTAL FIBULA: Status: ACTIVE | Noted: 2018-09-05

## 2020-05-22 PROBLEM — G43.019 INTRACTABLE MIGRAINE WITHOUT AURA AND WITHOUT STATUS MIGRAINOSUS: Status: ACTIVE | Noted: 2020-05-22

## 2020-05-22 PROBLEM — R42 DIZZINESS AND GIDDINESS: Status: ACTIVE | Noted: 2020-05-22

## 2020-05-26 ENCOUNTER — VIRTUAL VISIT (OUTPATIENT)
Dept: NEUROLOGY | Age: 75
End: 2020-05-26
Payer: MEDICARE

## 2020-05-26 PROCEDURE — 99443 PR PHYS/QHP TELEPHONE EVALUATION 21-30 MIN: CPT | Performed by: PSYCHIATRY & NEUROLOGY

## 2020-05-26 RX ORDER — SUMATRIPTAN 100 MG/1
100 TABLET, FILM COATED ORAL
Qty: 20 TABLET | Refills: 2 | Status: SHIPPED | OUTPATIENT
Start: 2020-05-26 | End: 2020-11-16 | Stop reason: SDUPTHER

## 2020-05-26 RX ORDER — SUMATRIPTAN 100 MG/1
100 TABLET, FILM COATED ORAL
Qty: 20 TABLET | Refills: 2 | Status: SHIPPED | OUTPATIENT
Start: 2020-05-26 | End: 2020-05-26

## 2020-05-26 ASSESSMENT — ENCOUNTER SYMPTOMS
NAUSEA: 0
COLOR CHANGE: 0
CHOKING: 0
TROUBLE SWALLOWING: 0
VOMITING: 0
BACK PAIN: 0
SHORTNESS OF BREATH: 0
PHOTOPHOBIA: 0

## 2020-05-26 NOTE — PROGRESS NOTES
Subjective:      Patient ID: Basil Muir is a 66 y.o. female who presents today for:  Chief Complaint   Patient presents with    Follow-up     Patient needs paper script for imetrex so she can get it sent to the place she uses in [facility]). Patient states that she is starting to have problems with the miraines again. She states that they went away for about a year. Patient states that she gets them about 2-3 times a weeks and as long as she takes the Imitrex it is gone in about 30 mins. Due to COVID 19 outbreak, patient's office visit was converted to a virtual visit. Patient was contacted and agreed to proceed with a virtual visit via Telephone Visit  The risks and benefits of converting to a virtual visit were discussed in light of the current infectious disease epidemic. Patient also understood that insurance coverage and co-pays are up to their individual insurance plans. pT is at home I am in my office  HPI December right-handed female with a history of migraine headaches dizziness and blood pressure vertigo. We have not seen her for a year. Since last seen patient's headaches have worsened. She now reports 2-3 headache days a week which is going on for the past few weeks. She does take Imitrex which does help she is not on any preventive therapy. She was tried on multiple medications in the past which had not helped her. She was on Topamax in the past and also tried on some antidepressants this was many years ago when new medications were not available. She obtains her Imitrex from [facility]) to which she partially responds. She does not have new dizzy spells she has cluster migraine headaches and she did not do well on meclizine.     Past Medical History:   Diagnosis Date    Anxiety     Irritable bowel syndrome with diarrhea     Migraine     Sciatica of right side 4/27/2017    Vertigo      Past Surgical History:   Procedure Laterality Date    HYSTERECTOMY       Social History ENSURE ALL PATIENTS RECEIVE REMINDER NOTIFICATIONS AT THE APPROPRIATE TIME BASED ON THE RECOMMENDATIONS OF THIS EXAM.       Lab Results   Component Value Date    WBC 5.1 06/21/2016    RBC 5.07 06/21/2016    HGB 14.1 06/21/2016    HCT 43.7 06/21/2016    MCV 86.3 06/21/2016    MCH 27.8 06/21/2016    MCHC 32.2 06/21/2016    RDW 13.8 06/21/2016     06/21/2016    MPV 8.8 08/15/2013     Lab Results   Component Value Date     06/21/2016    K 4.2 06/21/2016     06/21/2016    CO2 27 06/21/2016    BUN 11 06/21/2016    CREATININE 0.63 06/21/2016    GFRAA >60.0 06/21/2016    LABGLOM >60.0 06/21/2016    GLUCOSE 93 06/21/2016    PROT 6.9 06/21/2016    LABALBU 4.3 06/21/2016    CALCIUM 9.4 06/21/2016    BILITOT 0.4 06/21/2016    ALKPHOS 90 06/21/2016    AST 14 06/21/2016    ALT 9 06/21/2016     No results found for: PROTIME, INR  Lab Results   Component Value Date    TSH 2.400 06/21/2016    XHSADADI18 346 06/21/2016    FOLATE >20.0 06/21/2016     Lab Results   Component Value Date    TRIG 112 06/21/2016    HDL 82 06/21/2016    LDLCALC 97 06/21/2016     No results found for: LABAMPH, BARBSCNU, LABBENZ, CANNAB, COCAINESCRN, LABMETH, OPIATESCREENURINE, PHENCYCLIDINESCREENURINE, PPXUR, ETOH  No results found for: LITHIUM, DILFRTOT, VALPROATE    Assessment:       Diagnosis Orders   1. Migraine without aura and with status migrainosus, not intractable  SUMAtriptan (IMITREX) 100 MG tablet    DISCONTINUED: SUMAtriptan (IMITREX) 100 MG tablet   2. Sciatica of right side     3. Dizziness and giddiness     4. Benign paroxysmal vertigo of both ears     Given headaches which are quite well treated now they have reoccurred. She is now in 3 headaches a week at least.  These are migrainous headaches and are not responded on the medical line of treatment. Patient is no longer a candidate for preventive medications as she has been tried on previous medications for many years till she was older.   She recalls beta-blockers and antidepressants. She was on Topamax for a while. Therefore only continued her on Imitrex which she gets it from candidiasis she did require infrequent medications. She is now having more headaches. We have therefore recommended Aimovig which would be the best for this patient for prevention as if there is no other side effects. We will see if the insurance company covers this in the interim she will continue on Imitrex with of other medications. Sciatica has not been bothersome she has not any new   back pain. History of she still has some occasional dizziness though she did not respond to meclizine and this may be a part of her vestibular migraine. We will keep an eye on this. Scription for Imitrex to be sent to [facility]) been printed. .    Time 21 minutes    Jacob Stanford MD, [id number], American Board of Psychiatry & Neurology  Board Certified in Vascular Neurology  Board Certified in Neuromuscular Medicine  Certified in [facility]:      No orders of the defined types were placed in this encounter. Orders Placed This Encounter   Medications    DISCONTD: SUMAtriptan (IMITREX) 100 MG tablet     Sig: Take 1 tablet by mouth once as needed for Migraine     Dispense:  20 tablet     Refill:  2    SUMAtriptan (IMITREX) 100 MG tablet     Sig: Take 1 tablet by mouth once as needed for Migraine     Dispense:  20 tablet     Refill:  2       No follow-ups on file.       Vick Goodman MD

## 2020-05-27 ENCOUNTER — TELEPHONE (OUTPATIENT)
Dept: NEUROLOGY | Age: 75
End: 2020-05-27

## 2020-05-27 NOTE — TELEPHONE ENCOUNTER
When I called number velma then said enter remote access code . Could not leave message .  If she calls please let her know that her imitrex script is ready for pickup for [employer]

## 2020-06-01 RX ORDER — ERENUMAB-AOOE 140 MG/ML
140 INJECTION, SOLUTION SUBCUTANEOUS
Qty: 1 PEN | Refills: 11 | OUTPATIENT
Start: 2020-06-01 | End: 2021-04-01

## 2020-06-04 ENCOUNTER — TELEPHONE (OUTPATIENT)
Dept: NEUROLOGY | Age: 75
End: 2020-06-04

## 2020-06-06 RX ORDER — CYCLOBENZAPRINE HCL 10 MG
10 TABLET ORAL EVERY 8 HOURS PRN
Qty: 60 TABLET | Refills: 0 | Status: SHIPPED | OUTPATIENT
Start: 2020-06-06 | End: 2020-06-26

## 2020-06-06 NOTE — TELEPHONE ENCOUNTER
Pharmacy requesting medication refill.  Please approve or deny this request.    Rx requested:  Requested Prescriptions     Pending Prescriptions Disp Refills    cyclobenzaprine (FLEXERIL) 10 MG tablet [Pharmacy Med Name: CYCLOBENZAPRINE 10MG TABLETS] 60 tablet 0     Sig: TAKE 1 TABLET BY MOUTH EVERY 8 HOURS AS NEEDED FOR MUSCLE SPASMS         Last Office Visit:   3/5/2020      Next Visit Date:  Future Appointments   Date Time Provider Sonali Patel   7/7/2020 11:00 AM [facility]   11/20/2020 11:00 AM Ting Sandy MD [facility]

## 2020-06-17 RX ORDER — DICYCLOMINE HYDROCHLORIDE 10 MG/1
CAPSULE ORAL
Qty: 120 CAPSULE | Refills: 5 | Status: SHIPPED | OUTPATIENT
Start: 2020-06-17 | End: 2021-11-29

## 2020-07-28 ENCOUNTER — VIRTUAL VISIT (OUTPATIENT)
Dept: PRIMARY CARE CLINIC | Age: 75
End: 2020-07-28
Payer: MEDICARE

## 2020-07-28 PROCEDURE — 99442 PR PHYS/QHP TELEPHONE EVALUATION 11-20 MIN: CPT | Performed by: INTERNAL MEDICINE

## 2020-07-28 RX ORDER — ALPRAZOLAM 0.5 MG/1
0.5 TABLET ORAL 3 TIMES DAILY PRN
Qty: 90 TABLET | Refills: 2 | Status: SHIPPED | OUTPATIENT
Start: 2020-07-28 | End: 2021-02-22 | Stop reason: SDUPTHER

## 2020-07-28 RX ORDER — CYCLOBENZAPRINE HCL 10 MG
10 TABLET ORAL 2 TIMES DAILY PRN
Qty: 60 TABLET | Refills: 5 | Status: SHIPPED | OUTPATIENT
Start: 2020-07-28 | End: 2021-01-14

## 2020-07-28 RX ORDER — MECLIZINE HYDROCHLORIDE 25 MG/1
25 TABLET ORAL 2 TIMES DAILY PRN
Qty: 60 TABLET | Refills: 5 | Status: SHIPPED | OUTPATIENT
Start: 2020-07-28 | End: 2021-05-11

## 2020-07-28 ASSESSMENT — ENCOUNTER SYMPTOMS
BACK PAIN: 1
FACIAL SWELLING: 0
APNEA: 0
CHOKING: 0
BLOOD IN STOOL: 0
PHOTOPHOBIA: 0
ABDOMINAL DISTENTION: 0

## 2020-07-28 NOTE — PROGRESS NOTES
facial swelling and nosebleeds. Eyes: Negative for photophobia and visual disturbance. Respiratory: Negative for apnea and choking. Cardiovascular: Negative for chest pain and palpitations. Gastrointestinal: Negative for abdominal distention and blood in stool. Genitourinary: Negative for dysuria. Musculoskeletal: Positive for arthralgias, back pain and myalgias. Negative for gait problem and joint swelling. Skin: Negative for rash. Neurological: Positive for dizziness and light-headedness. Negative for syncope and speech difficulty. Hematological: Does not bruise/bleed easily. Psychiatric/Behavioral: Positive for agitation, decreased concentration, dysphoric mood and sleep disturbance. Negative for hallucinations and suicidal ideas. The patient is nervous/anxious and has insomnia. Prior to Visit Medications    Medication Sig Taking? Authorizing Provider   ALPRAZolam Catalina Tapia) 0.5 MG tablet Take 1 tablet by mouth 3 times daily as needed for Sleep or Anxiety for up to 90 days.  Yes Monika Franz MD   cyclobenzaprine (FLEXERIL) 10 MG tablet Take 1 tablet by mouth 2 times daily as needed for Muscle spasms Yes Monika Franz MD   meclizine (ANTIVERT) 25 MG tablet Take 1 tablet by mouth 2 times daily as needed for Dizziness Yes Monika Franz MD   dicyclomine (BENTYL) 10 MG capsule TAKE 2 CAPSULES BY MOUTH FOUR TIMES DAILY AS NEEDED  Monika Franz MD   Erenumab-aooe (AIMOVIG) 140 MG/ML SOAJ Inject 140 mg into the skin every 30 days  Dacia Mark MD   SUMAtriptan (IMITREX) 100 MG tablet Take 1 tablet by mouth once as needed for Migraine  Dacia Mark MD   estrogens, conjugated, (PREMARIN) 0.3 MG tablet TAKE 1 TABLET BY MOUTH DAILY  Monika Franz MD   meclizine (ANTIVERT) 25 MG tablet TAKE 1 TABLET BY MOUTH TWICE DAILY AS NEEDED FOR DIZZINESS  Monika Franz MD   raloxifene (EVISTA) 60 MG tablet TAKE 1 TABLET BY MOUTH EVERY DAY  Peg MD Vinay   docusate sodium (DOK) 100 MG capsule Take 2 capsules by mouth nightly  Shila Santiago MD   PREMARIN 0.3 MG tablet TAKE 1 TABLET BY MOUTH DAILY  Mariana Weiss MD   ipratropium (ATROVENT) 0.06 % nasal spray USE 2 SPRAYS IN EACH NOSTRIL THREE TIMES DAILY  Shila Santiago MD   PREMARIN 0.625 MG/GM vaginal cream PLACE 0.5 GRAM VAGINALLY DAILY  Allison Canales MD   ondansetron (ZOFRAN) 4 MG tablet Take 1 tablet by mouth every 8 hours as needed for Nausea or Vomiting  Patient not taking: Reported on 3/5/2020  Shila Santiago MD   raloxifene (EVISTA) 60 MG tablet TAKE 1 TABLET BY MOUTH DAILY  Kiara Resendiz MD   conjugated estrogens (PREMARIN) 0.625 MG/GM vaginal cream PLACE 0.5 GRAM VAGINALLY DAILY  Kiara Garrison MD   chlordiazePOXIDE-clidinium (LIBRAX) 5-2.5 MG per capsule Take 1 capsule by mouth 2 times daily as needed (as needed)  Patient not taking: Reported on 5/26/2020  Shila Santiago MD   pantoprazole (PROTONIX) 40 MG tablet TAKE ONE TABLET BY MOUTH TWICE DAILY  Shila Santiago MD   TOVIAZ 4 MG TB24 ER tablet TAKE 1 TABLET BY MOUTH DAILY  Shila Santiago MD   ipratropium (ATROVENT) 0.06 % nasal spray USE 2 SPRAYS IN EACH NOSTRIL THREE TIMES DAILY  Shila Santiago MD   Azelastine-Fluticasone [facility]) 137-50 MCG/ACT SUSP 2 sprays by Nasal route 2 times daily as needed  Shila Santiago MD   glycopyrrolate (ROBINUL) 2 MG tablet Take 1 tablet by mouth 2 times daily  Shila Santiago MD   fluticasone (FLONASE) 50 MCG/ACT nasal spray USE 1 SPRAY IN EACH NOSTRIL DAILY  Shila Santiago MD   Cetirizine HCl (ZYRTEC ALLERGY) 10 MG CAPS Take  by mouth daily. Historical Provider, MD   Naproxen Sodium (ALEVE) 220 MG CAPS Take  by mouth as needed.   Historical Provider, MD       Social History     Tobacco Use    Smoking status: Never Smoker    Smokeless tobacco: Never Used   Substance Use Topics    Alcohol use: Yes     Comment: 1 daily    Drug use: No        Allergies   Allergen Reactions    Bactrim [Sulfamethoxazole-Trimethoprim] Shortness Of Breath    Albumen, Egg Diarrhea  Macrobid [Nitrofurantoin Macrocrystal]      SOB       PHYSICAL EXAMINATION:  [ INSTRUCTIONS:  \"[x]\" Indicates a positive item  \"[]\" Indicates a negative item  -- DELETE ALL ITEMS NOT EXAMINED]  [x] Alert  [] Oriented to person/place/time    [] No apparent distress  [] Toxic appearing    [] Face flushed appearing [] Sclera clear  [] Lips are cyanotic      [] Breathing appears normal  [] Appears tachypneic      [] Rash on visible skin    [] Cranial Nerves II-XII grossly intact    [] Motor grossly intact in visible upper extremities    [] Motor grossly intact in visible lower extremities    [] Normal Mood  [] Anxious appearing    [] Depressed appearing  [] Confused appearing      [] Poor short term memory  [] Poor long term memory    [] OTHER:      Due to this being a TeleHealth encounter, evaluation of the following organ systems is limited: Vitals/Constitutional/EENT/Resp/CV/GI//MS/Neuro/Skin/Heme-Lymph-Imm. ASSESSMENT/PLAN:  1. Vertigo    - ALPRAZolam (XANAX) 0.5 MG tablet; Take 1 tablet by mouth 3 times daily as needed for Sleep or Anxiety for up to 90 days. Dispense: 90 tablet; Refill: 2  - meclizine (ANTIVERT) 25 MG tablet; Take 1 tablet by mouth 2 times daily as needed for Dizziness  Dispense: 60 tablet; Refill: 5    2. Anxiety    - ALPRAZolam (XANAX) 0.5 MG tablet; Take 1 tablet by mouth 3 times daily as needed for Sleep or Anxiety for up to 90 days. Dispense: 90 tablet; Refill: 2    3. Muscle cramps    - cyclobenzaprine (FLEXERIL) 10 MG tablet; Take 1 tablet by mouth 2 times daily as needed for Muscle spasms  Dispense: 60 tablet; Refill: 5    4. Sciatica of right side    - cyclobenzaprine (FLEXERIL) 10 MG tablet; Take 1 tablet by mouth 2 times daily as needed for Muscle spasms  Dispense: 60 tablet;  Refill: 5    Controlled Substances Monitoring: Periodic Controlled Substance Monitoring: Possible medication side effects, risk of tolerance/dependence & alternative treatments discussed., No signs of

## 2020-11-16 RX ORDER — SUMATRIPTAN 100 MG/1
100 TABLET, FILM COATED ORAL
Qty: 9 TABLET | Refills: 5 | Status: SHIPPED | OUTPATIENT
Start: 2020-11-16 | End: 2021-06-14 | Stop reason: SDUPTHER

## 2020-11-20 RX ORDER — MECLIZINE HYDROCHLORIDE 25 MG/1
TABLET ORAL
Qty: 60 TABLET | Refills: 5 | Status: SHIPPED | OUTPATIENT
Start: 2020-11-20 | End: 2021-04-01 | Stop reason: CLARIF

## 2020-11-20 NOTE — TELEPHONE ENCOUNTER
Rx request   Requested Prescriptions     Pending Prescriptions Disp Refills    meclizine (ANTIVERT) 25 MG tablet 60 tablet 5     LOV Visit date not found  Next Visit Date:  Future Appointments   Date Time Provider Sonali Patel   4/2/2021 10:15 AM Malu Vargas MD [address] [place] [address]

## 2021-01-14 DIAGNOSIS — M54.31 SCIATICA OF RIGHT SIDE: ICD-10-CM

## 2021-01-14 DIAGNOSIS — R25.2 MUSCLE CRAMPS: ICD-10-CM

## 2021-01-14 RX ORDER — CYCLOBENZAPRINE HCL 10 MG
TABLET ORAL
Qty: 60 TABLET | Refills: 5 | Status: SHIPPED | OUTPATIENT
Start: 2021-01-14 | End: 2021-08-09

## 2021-01-14 NOTE — TELEPHONE ENCOUNTER
Pharmacy requesting medication refill.  Please approve or deny this request.    Rx requested:  Requested Prescriptions     Pending Prescriptions Disp Refills    cyclobenzaprine (FLEXERIL) 10 MG tablet [Pharmacy Med Name: CYCLOBENZAPRINE 10MG TABLETS] 60 tablet 5     Sig: TAKE 1 TABLET BY MOUTH TWICE DAILY AS NEEDED FOR MUSCLE SPASMS         Last Office Visit:   7/28/2020      Next Visit Date:  Future Appointments   Date Time Provider Sonali Patel   4/2/2021 10:15 AM Kailash Miranda MD [address] [place] [address]

## 2021-01-18 ENCOUNTER — TELEPHONE (OUTPATIENT)
Dept: PRIMARY CARE CLINIC | Age: 76
End: 2021-01-18

## 2021-01-18 DIAGNOSIS — H92.09 OTALGIA, UNSPECIFIED LATERALITY: Primary | ICD-10-CM

## 2021-01-18 DIAGNOSIS — J00 ACUTE NASOPHARYNGITIS: ICD-10-CM

## 2021-01-18 RX ORDER — AZITHROMYCIN 250 MG/1
TABLET, FILM COATED ORAL
Qty: 1 PACKET | Refills: 0 | Status: SHIPPED | OUTPATIENT
Start: 2021-01-18 | End: 2021-01-28

## 2021-01-18 NOTE — TELEPHONE ENCOUNTER
Patient has an earache, asking if you can call something in for her. She said she had this before. Makes her dizzy and there is ringing in her ear. She said she thinks she needs an antibiotic. She said before you squirted water in her ear and got wax out so she tried to do that yesterday and all she did was get dizzy.   Please advise

## 2021-02-22 ENCOUNTER — VIRTUAL VISIT (OUTPATIENT)
Dept: PRIMARY CARE CLINIC | Age: 76
End: 2021-02-22
Payer: MEDICARE

## 2021-02-22 DIAGNOSIS — R42 VERTIGO: ICD-10-CM

## 2021-02-22 DIAGNOSIS — R21 RASH: Primary | ICD-10-CM

## 2021-02-22 DIAGNOSIS — F41.9 ANXIETY: ICD-10-CM

## 2021-02-22 PROCEDURE — 99443 PR PHYS/QHP TELEPHONE EVALUATION 21-30 MIN: CPT | Performed by: INTERNAL MEDICINE

## 2021-02-22 RX ORDER — DOXEPIN HYDROCHLORIDE 25 MG/1
25 CAPSULE ORAL 3 TIMES DAILY PRN
Qty: 90 CAPSULE | Refills: 1 | Status: SHIPPED | OUTPATIENT
Start: 2021-02-22

## 2021-02-22 RX ORDER — ALPRAZOLAM 0.5 MG/1
0.5 TABLET ORAL 3 TIMES DAILY PRN
Qty: 90 TABLET | Refills: 2 | Status: SHIPPED | OUTPATIENT
Start: 2021-02-22 | End: 2021-07-22

## 2021-02-22 ASSESSMENT — PATIENT HEALTH QUESTIONNAIRE - PHQ9
SUM OF ALL RESPONSES TO PHQ9 QUESTIONS 1 & 2: 0
1. LITTLE INTEREST OR PLEASURE IN DOING THINGS: 0
SUM OF ALL RESPONSES TO PHQ QUESTIONS 1-9: 0
SUM OF ALL RESPONSES TO PHQ QUESTIONS 1-9: 0

## 2021-02-22 NOTE — PROGRESS NOTES
Phone  Aislinn Fair is a 66 y.o. female evaluated via telephone on 2/22/2021. Aislinn Fair 66 y.o. female presents today with No chief complaint on file. Mental Health Problem  The primary symptoms include dysphoric mood and somatic symptoms. The primary symptoms do not include hallucinations. The current episode started more than 1 month ago. This is a recurrent problem. The onset of the illness is precipitated by emotional stress and a stressful event. The degree of incapacity that she is experiencing as a consequence of her illness is moderate. Additional symptoms of the illness include anhedonia, insomnia and agitation. She does not admit to suicidal ideas. She does not contemplate harming herself. Risk factors that are present for mental illness include a history of mental illness. Past Medical History:   Diagnosis Date    Anxiety     Irritable bowel syndrome with diarrhea     Migraine     Sciatica of right side 4/27/2017    Vertigo      Patient Active Problem List    Diagnosis Date Noted    Intractable migraine without aura and without status migrainosus 05/22/2020    Dizziness and giddiness 05/22/2020    Benign paroxysmal vertigo of both ears 05/22/2020    Closed fracture of right distal fibula 09/05/2018    Acute right-sided low back pain with right-sided sciatica 04/27/2017    Sciatica of right side 04/27/2017     Past Surgical History:   Procedure Laterality Date    HYSTERECTOMY       No family history on file.   Social History     Socioeconomic History    Marital status:      Spouse name: Not on file    Number of children: Not on file    Years of education: Not on file    Highest education level: Not on file   Occupational History    Not on file   Social Needs    Financial resource strain: Patient refused    Food insecurity     Worry: Patient refused     Inability: Patient refused    Transportation needs     Medical: Patient refused Non-medical: Patient refused   Tobacco Use    Smoking status: Never Smoker    Smokeless tobacco: Never Used   Substance and Sexual Activity    Alcohol use: Yes     Comment: 1 daily    Drug use: No    Sexual activity: Not on file   Lifestyle    Physical activity     Days per week: Not on file     Minutes per session: Not on file    Stress: Not on file   Relationships    Social connections     Talks on phone: Not on file     Gets together: Not on file     Attends Anabaptist service: Not on file     Active member of club or organization: Not on file     Attends meetings of clubs or organizations: Not on file     Relationship status: Not on file    Intimate partner violence     Fear of current or ex partner: Not on file     Emotionally abused: Not on file     Physically abused: Not on file     Forced sexual activity: Not on file   Other Topics Concern    Not on file   Social History Narrative    Not on file     Allergies   Allergen Reactions    Bactrim [Sulfamethoxazole-Trimethoprim] Shortness Of Breath    Albumen, Egg Diarrhea    Macrobid [Nitrofurantoin Macrocrystal]      SOB       Review of Systems   Constitutional: Negative for chills and fever. HENT: Negative for facial swelling and nosebleeds. Eyes: Negative for photophobia and visual disturbance. Respiratory: Negative for apnea and choking. Cardiovascular: Negative for chest pain and palpitations. Gastrointestinal: Negative for abdominal distention and blood in stool. Genitourinary: Negative for enuresis and hematuria. Musculoskeletal: Negative for gait problem and joint swelling. Skin: Positive for rash. Neurological: Positive for dizziness and light-headedness. Negative for syncope and speech difficulty. Hematological: Does not bruise/bleed easily. Psychiatric/Behavioral: Positive for agitation and dysphoric mood. Negative for hallucinations and suicidal ideas. The patient has insomnia. There were no vitals filed for this visit. Physical Exam  Neurological:      Mental Status: She is alert. Assessment/Plan  Diagnoses and all orders for this visit:    Rash  -     doxepin (SINEQUAN) 25 MG capsule; Take 1 capsule by mouth 3 times daily as needed (prn)    Vertigo  -     ALPRAZolam (XANAX) 0.5 MG tablet; Take 1 tablet by mouth 3 times daily as needed for Sleep or Anxiety for up to 90 days. Anxiety  -     ALPRAZolam (XANAX) 0.5 MG tablet; Take 1 tablet by mouth 3 times daily as needed for Sleep or Anxiety for up to 90 days. Controlled Substances Monitoring: Periodic Controlled Substance Monitoring: Possible medication side effects, risk of tolerance/dependence & alternative treatments discussed., No signs of potential drug abuse or diversion identified. , Assessed functional status. Melba Guzmán MD)      No follow-ups on file. Melba Guzmán MD  Consent:  She and/or health care decision maker is aware that that she may receive a bill for this telephone service, depending on her insurance coverage, and has provided verbal consent to proceed: Yes      Documentation:  I communicated with the patient and/or health care decision maker about anxiety, vertigo. Details of this discussion including any medical advice provided: meds      I affirm this is a Patient Initiated Episode with a Patient who has not had a related appointment within my department in the past 7 days or scheduled within the next 24 hours.     Patient identification was verified at the start of the visit: Yes    Total Time: minutes: 21-30 minutes    Note: not billable if this call serves to triage the patient into an appointment for the relevant concern      Melba Guzmán

## 2021-02-28 ASSESSMENT — ENCOUNTER SYMPTOMS
FACIAL SWELLING: 0
APNEA: 0
ABDOMINAL DISTENTION: 0
BLOOD IN STOOL: 0
CHOKING: 0
PHOTOPHOBIA: 0

## 2021-03-11 ENCOUNTER — TELEPHONE (OUTPATIENT)
Dept: PRIMARY CARE CLINIC | Age: 76
End: 2021-03-11

## 2021-03-11 DIAGNOSIS — H92.09 OTALGIA, UNSPECIFIED LATERALITY: Primary | ICD-10-CM

## 2021-03-11 RX ORDER — CEFUROXIME AXETIL 500 MG/1
500 TABLET ORAL 2 TIMES DAILY
Qty: 20 TABLET | Refills: 0 | Status: SHIPPED | OUTPATIENT
Start: 2021-03-11 | End: 2021-03-21

## 2021-03-11 NOTE — TELEPHONE ENCOUNTER
Patient called and stated that she is still having ear pain could you call her in a medication [address] [place] [address]

## 2021-03-29 PROBLEM — G43.001 MIGRAINE WITHOUT AURA AND WITH STATUS MIGRAINOSUS, NOT INTRACTABLE: Status: ACTIVE | Noted: 2020-05-22

## 2021-04-01 ENCOUNTER — OFFICE VISIT (OUTPATIENT)
Dept: PRIMARY CARE CLINIC | Age: 76
End: 2021-04-01
Payer: MEDICARE

## 2021-04-01 VITALS
WEIGHT: 174 LBS | DIASTOLIC BLOOD PRESSURE: 70 MMHG | OXYGEN SATURATION: 97 % | TEMPERATURE: 97.6 F | HEART RATE: 84 BPM | SYSTOLIC BLOOD PRESSURE: 118 MMHG | HEIGHT: 63 IN | BODY MASS INDEX: 30.83 KG/M2

## 2021-04-01 DIAGNOSIS — H61.21 IMPACTED CERUMEN, RIGHT EAR: Primary | ICD-10-CM

## 2021-04-01 PROCEDURE — 1036F TOBACCO NON-USER: CPT | Performed by: INTERNAL MEDICINE

## 2021-04-01 PROCEDURE — G8427 DOCREV CUR MEDS BY ELIG CLIN: HCPCS | Performed by: INTERNAL MEDICINE

## 2021-04-01 PROCEDURE — 4040F PNEUMOC VAC/ADMIN/RCVD: CPT | Performed by: INTERNAL MEDICINE

## 2021-04-01 PROCEDURE — G8399 PT W/DXA RESULTS DOCUMENT: HCPCS | Performed by: INTERNAL MEDICINE

## 2021-04-01 PROCEDURE — 1090F PRES/ABSN URINE INCON ASSESS: CPT | Performed by: INTERNAL MEDICINE

## 2021-04-01 PROCEDURE — 1123F ACP DISCUSS/DSCN MKR DOCD: CPT | Performed by: INTERNAL MEDICINE

## 2021-04-01 PROCEDURE — 99214 OFFICE O/P EST MOD 30 MIN: CPT | Performed by: INTERNAL MEDICINE

## 2021-04-01 PROCEDURE — G8417 CALC BMI ABV UP PARAM F/U: HCPCS | Performed by: INTERNAL MEDICINE

## 2021-04-01 SDOH — ECONOMIC STABILITY: TRANSPORTATION INSECURITY
IN THE PAST 12 MONTHS, HAS THE LACK OF TRANSPORTATION KEPT YOU FROM MEDICAL APPOINTMENTS OR FROM GETTING MEDICATIONS?: NO

## 2021-04-01 ASSESSMENT — PATIENT HEALTH QUESTIONNAIRE - PHQ9
SUM OF ALL RESPONSES TO PHQ QUESTIONS 1-9: 0
SUM OF ALL RESPONSES TO PHQ QUESTIONS 1-9: 0

## 2021-04-01 NOTE — PROGRESS NOTES
Adam Woodall 66 y.o. female presents today with   Chief Complaint   Patient presents with    Otalgia     rt ear pain \"couple months\"    Health Maintenance       Ear Fullness   There is pain in the right ear. This is a new problem. The current episode started in the past 7 days. The problem occurs every few minutes. The problem has been waxing and waning. Pertinent negatives include no abdominal pain, coughing, rash or rhinorrhea. Past Medical History:   Diagnosis Date    Anxiety     Irritable bowel syndrome with diarrhea     Migraine     Sciatica of right side 4/27/2017    Vertigo      Patient Active Problem List    Diagnosis Date Noted    Migraine without aura and with status migrainosus, not intractable 05/22/2020    Dizziness and giddiness 05/22/2020    Benign paroxysmal vertigo of both ears 05/22/2020    Closed fracture of right distal fibula 09/05/2018    Acute right-sided low back pain with right-sided sciatica 04/27/2017    Sciatica of right side 04/27/2017     Past Surgical History:   Procedure Laterality Date    HYSTERECTOMY       No family history on file.   Social History     Socioeconomic History    Marital status:      Spouse name: None    Number of children: None    Years of education: None    Highest education level: None   Occupational History    None   Social Needs    Financial resource strain: Not hard at all   [place] insecurity     Worry: Patient refused     Inability: Patient refused    Transportation needs     Medical: No     Non-medical: No   Tobacco Use    Smoking status: Never Smoker    Smokeless tobacco: Never Used   Substance and Sexual Activity    Alcohol use: Yes     Comment: 1 daily    Drug use: No    Sexual activity: None   Lifestyle    Physical activity     Days per week: None     Minutes per session: None    Stress: None   Relationships    Social connections     Talks on phone: None     Gets together: None     Attends [employer] service: oriented to person, place, and time. Psychiatric:         Mood and Affect: Mood normal.          Irrigated right ear. Assessment/Plan  Elyse Francis was seen today for otalgia and health maintenance. Diagnoses and all orders for this visit:    Impacted cerumen, right ear        No follow-ups on file.     Irwin Sunshine MD

## 2021-04-04 ASSESSMENT — ENCOUNTER SYMPTOMS
CHOKING: 0
RHINORRHEA: 0
ABDOMINAL PAIN: 0
PHOTOPHOBIA: 0
COUGH: 0

## 2021-04-05 ENCOUNTER — VIRTUAL VISIT (OUTPATIENT)
Dept: PRIMARY CARE CLINIC | Age: 76
End: 2021-04-05
Payer: MEDICARE

## 2021-04-05 DIAGNOSIS — R53.83 FATIGUE, UNSPECIFIED TYPE: ICD-10-CM

## 2021-04-05 DIAGNOSIS — R53.83 FATIGUE, UNSPECIFIED TYPE: Primary | ICD-10-CM

## 2021-04-05 DIAGNOSIS — M79.10 MUSCLE ACHE: ICD-10-CM

## 2021-04-05 DIAGNOSIS — R68.83 CHILLS: ICD-10-CM

## 2021-04-05 PROCEDURE — 99442 PR PHYS/QHP TELEPHONE EVALUATION 11-20 MIN: CPT | Performed by: NURSE PRACTITIONER

## 2021-04-05 ASSESSMENT — ENCOUNTER SYMPTOMS
NAUSEA: 0
SHORTNESS OF BREATH: 0
ABDOMINAL PAIN: 0
EYE PAIN: 0
SORE THROAT: 1
COUGH: 0
RHINORRHEA: 0
DIARRHEA: 0
EYE REDNESS: 0
VOMITING: 0
CHEST TIGHTNESS: 0
WHEEZING: 0
TROUBLE SWALLOWING: 0
SINUS PRESSURE: 0
SINUS PAIN: 0
VOICE CHANGE: 0

## 2021-04-05 NOTE — PROGRESS NOTES
for Migraine 9 tablet 5    meclizine (ANTIVERT) 25 MG tablet Take 1 tablet by mouth 2 times daily as needed for Dizziness 60 tablet 5    dicyclomine (BENTYL) 10 MG capsule TAKE 2 CAPSULES BY MOUTH FOUR TIMES DAILY AS NEEDED 120 capsule 5    Erenumab-aooe (AIMOVIG) 140 MG/ML SOAJ Inject 140 mg into the skin every 30 days 1 pen 11    estrogens, conjugated, (PREMARIN) 0.3 MG tablet TAKE 1 TABLET BY MOUTH DAILY 30 tablet 11    docusate sodium (DOK) 100 MG capsule Take 2 capsules by mouth nightly (Patient not taking: Reported on 4/1/2021) 60 capsule 2    raloxifene (EVISTA) 60 MG tablet TAKE 1 TABLET BY MOUTH DAILY 90 tablet 4    conjugated estrogens (PREMARIN) 0.625 MG/GM vaginal cream PLACE 0.5 GRAM VAGINALLY DAILY 30 g 3    pantoprazole (PROTONIX) 40 MG tablet TAKE ONE TABLET BY MOUTH TWICE DAILY 60 tablet 5    TOVIAZ 4 MG TB24 ER tablet TAKE 1 TABLET BY MOUTH DAILY 30 tablet 5    ipratropium (ATROVENT) 0.06 % nasal spray USE 2 SPRAYS IN EACH NOSTRIL THREE TIMES DAILY 90 mL 3    Azelastine-Fluticasone (DYMISTA) 137-50 MCG/ACT SUSP 2 sprays by Nasal route 2 times daily as needed 1 Bottle 9    glycopyrrolate (ROBINUL) 2 MG tablet Take 1 tablet by mouth 2 times daily 60 tablet 5    fluticasone (FLONASE) 50 MCG/ACT nasal spray USE 1 SPRAY IN EACH NOSTRIL DAILY 1 Bottle 3    Cetirizine HCl (ZYRTEC ALLERGY) 10 MG CAPS Take  by mouth daily.  Naproxen Sodium (ALEVE) 220 MG CAPS Take  by mouth as needed. No current facility-administered medications on file prior to visit.       Past Medical History:   Diagnosis Date    Anxiety     Irritable bowel syndrome with diarrhea     Migraine     Sciatica of right side 4/27/2017    Vertigo      Past Surgical History:   Procedure Laterality Date    HYSTERECTOMY       Social History     Socioeconomic History    Marital status:      Spouse name: Not on file    Number of children: Not on file    Years of education: Not on file    Highest education level: Not on file   Occupational History    Not on file   Social Needs    Financial resource strain: Not hard at all   [place] insecurity     Worry: Patient refused     Inability: Patient refused    Transportation needs     Medical: No     Non-medical: No   Tobacco Use    Smoking status: Never Smoker    Smokeless tobacco: Never Used   Substance and Sexual Activity    Alcohol use: Yes     Comment: 1 daily    Drug use: No    Sexual activity: Not on file   Lifestyle    Physical activity     Days per week: Not on file     Minutes per session: Not on file    Stress: Not on file   Relationships    Social connections     Talks on phone: Not on file     Gets together: Not on file     Attends [employer] service: Not on file     Active member of club or organization: Not on file     Attends meetings of clubs or organizations: Not on file     Relationship status: Not on file    Intimate partner violence     Fear of current or ex partner: Not on file     Emotionally abused: Not on file     Physically abused: Not on file     Forced sexual activity: Not on file   Other Topics Concern    Not on file   Social History Narrative    Not on file     No family history on file. Allergies:  Bactrim [sulfamethoxazole-trimethoprim]; Albumen, egg; and Macrobid [nitrofurantoin macrocrystal]    Review of Systems   Constitutional: Positive for activity change, chills and fatigue. Negative for appetite change, diaphoresis, fever and unexpected weight change. HENT: Positive for ear pain and sore throat. Negative for congestion, ear discharge, postnasal drip, rhinorrhea, sinus pressure, sinus pain, sneezing, tinnitus, trouble swallowing and voice change. Eyes: Negative for pain and redness. Respiratory: Negative for cough, chest tightness, shortness of breath and wheezing. Cardiovascular: Negative for chest pain. Gastrointestinal: Negative for abdominal pain, diarrhea, nausea and vomiting.    Musculoskeletal: Positive for arthralgias and myalgias. Skin: Negative for rash. Neurological: Positive for weakness. Negative for dizziness, light-headedness and headaches. PHYSICAL EXAM / RESULTS    There were no vitals taken for this visit. Physical Exam  HENT:      Right Ear: Hearing normal.      Left Ear: Hearing normal.   Neurological:      General: No focal deficit present. Mental Status: She is alert and oriented to person, place, and time. Psychiatric:         Attention and Perception: Attention and perception normal.         Mood and Affect: Mood and affect normal.         Speech: Speech normal.         Behavior: Behavior normal. Behavior is cooperative. Cognition and Memory: Cognition and memory normal.         Judgment: Judgment normal.       Vitals signs: pt does not have the proper equipment to take all VS.    The physical is not performed due to the visit being a telephone counter as indicated due to the restrictions of the COVID-19 pandemic    No results found for this or any previous visit (from the past 2016 hour(s)). Assessment:       Diagnosis Orders   1. Fatigue, unspecified type  Covid-19 Ambulatory   2. Muscle ache  Covid-19 Ambulatory   3. Chills  Covid-19 Ambulatory   Discussed with patient will test for Covid given her generalized symptoms. She does not leave the house generally so she will cont with quarantine until results are back. She will cont rest, fluids, and monitoring symptoms. Advised to call with worsening sx including fever/breathing sx. Orders Placed This Encounter   Procedures    Covid-19 Ambulatory     Standing Status:   Future     Standing Expiration Date:   4/5/2022     Scheduling Instructions:      Saline media preferred given current shortage of viral transport media but both acceptable     Order Specific Question:   Is this test for diagnosis or screening?      Answer:   Diagnosis of ill patient     Order Specific Question:   Symptomatic for COVID-19 as sample of saliva. One way a sample is collected is by putting a long swab into the back of your nose. How is it treated? Mild cases of COVID-19 can be treated at home. Serious cases need treatment in the hospital. Treatment may include medicines to reduce symptoms, plus breathing support such as oxygen therapy or a ventilator. Some people may be placed on their belly to help their oxygen levels. Treatments that may help people who have COVID-19 include:  Antiviral medicines. These medicines treat viral infections. Remdesivir is an example. Immune-based therapy. These medicines help the immune system fight COVID-19. One example is bamlanivimab. It's a monoclonal antibody. Blood thinners. These medicines help prevent blood clots. People with severe illness are at risk for blood clots. How can you protect yourself and others? The best way to protect yourself from getting sick is to:  · Avoid areas where there is an outbreak. · Avoid contact with people who may be infected. · Avoid crowds and try to stay at least 6 feet away from other people. · Wash your hands often, especially after you cough or sneeze. Use soap and water, and scrub for at least 20 seconds. If soap and water aren't available, use an alcohol-based hand . · Avoid touching your mouth, nose, and eyes. To help avoid spreading the virus to others:  · Stay home if you are sick or have been exposed to the virus. Don't go to school, work, or public areas. And don't use public transportation, ride-shares, or taxis unless you have no choice. · Wear a cloth face cover if you have to go to public areas. · Cover your mouth with a tissue when you cough or sneeze. Then throw the tissue in the trash and wash your hands right away. · If you're sick:  ? Leave your home only if you need to get medical care. But call the doctor's office first so they know you're coming. And wear a face cover. ?  Wear the face cover whenever you're around other people. It can help stop the spread of the virus when you cough or sneeze. ? Limit contact with pets and people in your home. If possible, stay in a separate bedroom and use a separate bathroom. ? Clean and disinfect your home every day. Use household  and disinfectant wipes or sprays. Take special care to clean things that you grab with your hands. These include doorknobs, remote controls, phones, and handles on your refrigerator and microwave. And don't forget countertops, tabletops, bathrooms, and computer keyboards. When should you call for help? Call 911 anytime you think you may need emergency care. For example, call if you have life-threatening symptoms, such as:    · You have severe trouble breathing. (You can't talk at all.)     · You have constant chest pain or pressure.     · You are severely dizzy or lightheaded.     · You are confused or can't think clearly.     · Your face and lips have a blue color.     · You pass out (lose consciousness) or are very hard to wake up. Call your doctor now or seek immediate medical care if:    · You have moderate trouble breathing. (You can't speak a full sentence.)     · You are coughing up blood (more than about 1 teaspoon).     · You have signs of low blood pressure. These include feeling lightheaded; being too weak to stand; and having cold, pale, clammy skin. Watch closely for changes in your health, and be sure to contact your doctor if:    · Your symptoms get worse.     · You are not getting better as expected. Call before you go to the doctor's office. Follow their instructions. And wear a cloth face cover. Current as of: December 18, 2020               Content Version: 12.8  © 4561-0380 Healthwise, Incorporated. Care instructions adapted under license by [facility] ([facility]).  If you have questions about a medical condition or this instruction, always ask your healthcare professional. Jeromy Lazaro disclaims any warranty or liability for your use of this information. We'll call with COVID-19 results  Results will also be available on your [facility] account, if activated  Things to Know:   - Do not leave home except to get medical care while waiting for your results  - Testing does not change treatment--> there is no medication that treats COVID-19  - Drink plenty of fluids and rest as much as needed  - May take over the counter medicine such as ibuprofen (aka Motrin/ Advil) or acetaminophen (aka Tylenol) for pain or fever, follow directions on label  - Continually monitor symptoms + contact a medical provider if symptoms are worsening, such as difficulty breathing  - Avoid exposure to environmental irritants/ allergens where possible    - Practice social distancing and wear a face mask when leaving home is necessary  - Symptoms may develop 2 days to 2 weeks following exposure to the virus- if you are exposed after testing, or if you were in the incubation period when tested, you could become ill with COVID-19 later    Keep a low threshold to go to ER or call 9-1-1 for new or suddenly worsening symptoms --> trouble breathing, high fevers that are unresponsive to fever-reducing medications, difficulty staying awake, vomiting/ unable to keep food or fluids down, any other symptoms that you think could be an emergency.         This visit ended at 1328    Electronically signed by GAETANO To CNP

## 2021-04-05 NOTE — PATIENT INSTRUCTIONS
These medicines help prevent blood clots. People with severe illness are at risk for blood clots. How can you protect yourself and others? The best way to protect yourself from getting sick is to:  · Avoid areas where there is an outbreak. · Avoid contact with people who may be infected. · Avoid crowds and try to stay at least 6 feet away from other people. · Wash your hands often, especially after you cough or sneeze. Use soap and water, and scrub for at least 20 seconds. If soap and water aren't available, use an alcohol-based hand . · Avoid touching your mouth, nose, and eyes. To help avoid spreading the virus to others:  · Stay home if you are sick or have been exposed to the virus. Don't go to school, work, or public areas. And don't use public transportation, ride-shares, or taxis unless you have no choice. · Wear a cloth face cover if you have to go to public areas. · Cover your mouth with a tissue when you cough or sneeze. Then throw the tissue in the trash and wash your hands right away. · If you're sick:  ? Leave your home only if you need to get medical care. But call the doctor's office first so they know you're coming. And wear a face cover. ? Wear the face cover whenever you're around other people. It can help stop the spread of the virus when you cough or sneeze. ? Limit contact with pets and people in your home. If possible, stay in a separate bedroom and use a separate bathroom. ? Clean and disinfect your home every day. Use household  and disinfectant wipes or sprays. Take special care to clean things that you grab with your hands. These include doorknobs, remote controls, phones, and handles on your refrigerator and microwave. And don't forget countertops, tabletops, bathrooms, and computer keyboards. When should you call for help? Call 911 anytime you think you may need emergency care.  For example, call if you have life-threatening symptoms, such as:    · You have severe trouble breathing. (You can't talk at all.)     · You have constant chest pain or pressure.     · You are severely dizzy or lightheaded.     · You are confused or can't think clearly.     · Your face and lips have a blue color.     · You pass out (lose consciousness) or are very hard to wake up. Call your doctor now or seek immediate medical care if:    · You have moderate trouble breathing. (You can't speak a full sentence.)     · You are coughing up blood (more than about 1 teaspoon).     · You have signs of low blood pressure. These include feeling lightheaded; being too weak to stand; and having cold, pale, clammy skin. Watch closely for changes in your health, and be sure to contact your doctor if:    · Your symptoms get worse.     · You are not getting better as expected. Call before you go to the doctor's office. Follow their instructions. And wear a cloth face cover. Current as of: December 18, 2020               Content Version: 12.8  © 2006-2021 Healthwise, Incorporated. Care instructions adapted under license by [facility] ([facility]). If you have questions about a medical condition or this instruction, always ask your healthcare professional. [place] [address] any warranty or liability for your use of this information.      We'll call with COVID-19 results  Results will also be available on your [facility] account, if activated  Things to Know:   - Do not leave home except to get medical care while waiting for your results  - Testing does not change treatment--> there is no medication that treats COVID-19  - Drink plenty of fluids and rest as much as needed  - May take over the counter medicine such as ibuprofen (aka Motrin/ Advil) or acetaminophen (aka Tylenol) for pain or fever, follow directions on label  - Continually monitor symptoms + contact a medical provider if symptoms are worsening, such as difficulty breathing  - Avoid exposure to environmental irritants/ allergens where possible    - Practice social distancing and wear a face mask when leaving home is necessary  - Symptoms may develop 2 days to 2 weeks following exposure to the virus- if you are exposed after testing, or if you were in the incubation period when tested, you could become ill with COVID-19 later    Keep a low threshold to go to ER or call 9-1-1 for new or suddenly worsening symptoms --> trouble breathing, high fevers that are unresponsive to fever-reducing medications, difficulty staying awake, vomiting/ unable to keep food or fluids down, any other symptoms that you think could be an emergency.

## 2021-04-06 LAB
SARS-COV-2: NOT DETECTED
SOURCE: NORMAL

## 2021-04-14 ENCOUNTER — VIRTUAL VISIT (OUTPATIENT)
Dept: PRIMARY CARE CLINIC | Age: 76
End: 2021-04-14
Payer: MEDICARE

## 2021-04-14 DIAGNOSIS — R53.83 FATIGUE, UNSPECIFIED TYPE: ICD-10-CM

## 2021-04-14 DIAGNOSIS — Z11.52 ENCOUNTER FOR SCREENING FOR COVID-19: ICD-10-CM

## 2021-04-14 DIAGNOSIS — R52 GENERALIZED BODY ACHES: ICD-10-CM

## 2021-04-14 DIAGNOSIS — J01.00 ACUTE NON-RECURRENT MAXILLARY SINUSITIS: Primary | ICD-10-CM

## 2021-04-14 DIAGNOSIS — J01.00 ACUTE NON-RECURRENT MAXILLARY SINUSITIS: ICD-10-CM

## 2021-04-14 PROCEDURE — 99442 PR PHYS/QHP TELEPHONE EVALUATION 11-20 MIN: CPT | Performed by: NURSE PRACTITIONER

## 2021-04-14 RX ORDER — AZITHROMYCIN 250 MG/1
TABLET, FILM COATED ORAL
Qty: 1 PACKET | Refills: 0 | Status: SHIPPED | OUTPATIENT
Start: 2021-04-14 | End: 2021-05-13

## 2021-04-14 RX ORDER — AMOXICILLIN 875 MG/1
875 TABLET, COATED ORAL 2 TIMES DAILY
Qty: 14 TABLET | Refills: 0 | Status: CANCELLED
Start: 2021-04-14 | End: 2021-04-21

## 2021-04-14 ASSESSMENT — ENCOUNTER SYMPTOMS
WHEEZING: 0
SINUS PAIN: 1
APNEA: 0
TROUBLE SWALLOWING: 0
ABDOMINAL DISTENTION: 0
SINUS PRESSURE: 1
EYE REDNESS: 0
VOMITING: 0
SHORTNESS OF BREATH: 0
SORE THROAT: 0
CONSTIPATION: 0
DIARRHEA: 0
CHEST TIGHTNESS: 0
CHANGE IN BOWEL HABIT: 0
COUGH: 0
NAUSEA: 0
ABDOMINAL PAIN: 0
EYE ITCHING: 0

## 2021-04-14 NOTE — PATIENT INSTRUCTIONS
\"Fatigue: Care Instructions. \"     If you do not have an account, please click on the \"Sign Up Now\" link. Current as of: February 26, 2020               Content Version: 12.8  © 2006-2021 [employer]. Care instructions adapted under license by [employer] [address] If you have questions about a medical condition or this instruction, always ask your healthcare professional. [address] any warranty or liability for your use of this information. Patient Education        Saline Nasal Washes: Care Instructions  Your Care Instructions     Saline nasal washes help keep the nasal passages open by washing out thick or dried mucus. This simple remedy can help relieve symptoms of allergies, sinusitis, and colds. It also can make the nose feel more comfortable by keeping the mucous membranes moist. You may notice a little burning sensation in your nose the first few times you use the solution, but this usually gets better in a few days. Follow-up care is a key part of your treatment and safety. Be sure to make and go to all appointments, and call your doctor if you are having problems. It's also a good idea to know your test results and keep a list of the medicines you take. How can you care for yourself at home? · You can buy premixed saline solution in a squeeze bottle or other sinus rinse products at a drugstore. Read and follow the instructions on the label. · You also can make your own saline solution by adding 1 teaspoon of salt and 1 teaspoon of baking soda to 2 cups of distilled water. · If you use a homemade solution, pour a small amount into a clean bowl. Using a rubber bulb syringe, squeeze the syringe and place the tip in the salt water. Pull a small amount of the salt water into the syringe by relaxing your hand. · Sit down with your head tilted slightly back. Do not lie down. Put the tip of the bulb syringe or the squeeze bottle a little way into one of your nostrils. Gently drip or squirt a few drops into the nostril. Repeat with the other nostril. Some sneezing and gagging are normal at first.  · Gently blow your nose. · Wipe the syringe or bottle tip clean after each use. · Repeat this 2 or 3 times a day. · Use nasal washes gently if you have nosebleeds often. When should you call for help? Watch closely for changes in your health, and be sure to contact your doctor if:    · You often get nosebleeds.     · You have problems doing the nasal washes. Where can you learn more? Go to https://7fgamepepiceweb.Hotel[employer]. org and sign in to your [employer] account. Enter [phone] in the [employer] box to learn more about \"Saline Nasal Washes: Care Instructions. \"     If you do not have an account, please click on the \"Sign Up Now\" link. Current as of: December 2, 2020               Content Version: 12.8  © 2006-2021 [employer]. Care instructions adapted under license by [employer] ([facility]). If you have questions about a medical condition or this instruction, always ask your healthcare professional. [address] any warranty or liability for your use of this information. Patient Education        The Sinuses: Anatomy Sketch    Current as of: December 2, 2020               Content Version: 12.8  © 3628-8455 [employer]. Care instructions adapted under license by [employer] ([facility]). If you have questions about a medical condition or this instruction, always ask your healthcare professional. [address] any warranty or liability for your use of this information. Patient Education        Sinusitis: Care Instructions  Your Care Instructions     Sinusitis is an infection of the lining of the sinus cavities in your head. Sinusitis often follows a cold. It causes pain and pressure in your head and face. In most cases, sinusitis gets better on its own in 1 to 2 weeks.  But some mild symptoms may last for several weeks. Sometimes antibiotics are needed. Follow-up care is a key part of your treatment and safety. Be sure to make and go to all appointments, and call your doctor if you are having problems. It's also a good idea to know your test results and keep a list of the medicines you take. How can you care for yourself at home? · Take an over-the-counter pain medicine, such as acetaminophen (Tylenol), ibuprofen (Advil, Motrin), or naproxen (Aleve). Read and follow all instructions on the label. · If the doctor prescribed antibiotics, take them as directed. Do not stop taking them just because you feel better. You need to take the full course of antibiotics. · Be careful when taking over-the-counter cold or flu medicines and Tylenol at the same time. Many of these medicines have acetaminophen, which is Tylenol. Read the labels to make sure that you are not taking more than the recommended dose. Too much acetaminophen (Tylenol) can be harmful. · Breathe warm, moist air from a steamy shower, a hot bath, or a sink filled with hot water. Avoid cold, dry air. Using a humidifier in your home may help. Follow the directions for cleaning the machine. · Use saline (saltwater) nasal washes. This can help keep your nasal passages open and wash out mucus and bacteria. You can buy saline nose drops at a grocery store or drugstore. Or you can make your own at home by adding 1 teaspoon of salt and 1 teaspoon of baking soda to 2 cups of distilled water. If you make your own, fill a bulb syringe with the solution, insert the tip into your nostril, and squeeze gently. Bruno Leer your nose. · Put a hot, wet towel or a warm gel pack on your face 3 or 4 times a day for 5 to 10 minutes each time. · Try a decongestant nasal spray like oxymetazoline (Afrin). Do not use it for more than 3 days in a row. Using it for more than 3 days can make your congestion worse. When should you call for help?    Call your doctor now or seek immediate areas.  · Cover your mouth with a tissue when you cough or sneeze. Then throw the tissue in the trash and wash your hands right away. · If you're sick:  ? Leave your home only if you need to get medical care. But call the doctor's office first so they know you're coming. And wear a face cover. ? Wear the face cover whenever you're around other people. It can help stop the spread of the virus. ? Limit contact with pets and people in your home. If possible, stay in a separate bedroom and use a separate bathroom. ? Clean and disinfect your home every day. Use household  and disinfectant wipes or sprays. Take special care to clean things that you grab with your hands. These include doorknobs, remote controls, phones, and handles on your refrigerator and microwave. And don't forget countertops, tabletops, bathrooms, and computer keyboards. When should you call for help? Call 911 anytime you think you may need emergency care. For example, call if you have life-threatening symptoms, such as:    · You have severe trouble breathing. (You can't talk at all.)     · You have constant chest pain or pressure.     · You are severely dizzy or lightheaded.     · You are confused or can't think clearly.     · Your face and lips have a blue color.     · You pass out (lose consciousness) or are very hard to wake up. Call your doctor now or seek immediate medical care if:    · You have moderate trouble breathing. (You can't speak a full sentence.)     · You are coughing up blood (more than about 1 teaspoon).     · You have signs of low blood pressure. These include feeling lightheaded; being too weak to stand; and having cold, pale, clammy skin. Watch closely for changes in your health, and be sure to contact your doctor if:    · Your symptoms get worse.     · You are not getting better as expected. Call before you go to the doctor's office. Follow their instructions. And wear a cloth face cover.   Current as of: February 19, 2021               Content Version: 12.8  © 5044-5173 Healthwise, Incorporated. Care instructions adapted under license by [facility] [place] ([facility]). If you have questions about a medical condition or this instruction, always ask your healthcare professional. [address] any warranty or liability for your use of this information.

## 2021-04-14 NOTE — PROGRESS NOTES
2021  Pt and provider completed a telephone visit today. Pt was at home and provider was in her office. TELEHEALTH EVALUATION -- Audio/Visual (During ZSLZE-66 public health emergency)    HPI:  Generalized Body Aches  This is a new problem. The current episode started 1 to 4 weeks ago (x 2 weeks). The problem occurs intermittently. Associated symptoms include fatigue, headaches (pt denies it is the worst HA of life or thunderclap in appearance . Pt reports a HX of migraines and she will take her imitrex today) and myalgias. Pertinent negatives include no abdominal pain, anorexia, change in bowel habit, chest pain, chills, congestion, coughing, fever, nausea, rash, sore throat, urinary symptoms or vomiting. Nothing aggravates the symptoms. She has tried acetaminophen for the symptoms. The treatment provided moderate relief. Sinusitis  This is a chronic (pt reports chronic sinus issues) problem. The problem has been waxing and waning since onset. There has been no fever. The pain is mild. Associated symptoms include headaches (pt denies it is the worst HA of life or thunderclap in appearance . Pt reports a HX of migraines and she will take her imitrex today) and sinus pressure. Pertinent negatives include no chills, congestion, coughing, ear pain, shortness of breath or sore throat. Pt was seen virtually on  for some similar s/s and pt was tested for Covid and neg. Pt reports waking up today still being tired, muscle aches, headaches and sinus issues and congestion and requesting an oral ATB and to get re tested for Covid. Discussed with pt about the Flu test but pt denied a flu test today and will come to get the Covid test today.     Adriana Benavides (:  1945) has requested an audio/video evaluation for the following concern(s):    Chief Complaint   Patient presents with    Generalized Body Aches     on and off x 2 weeks    Fatigue     x 2 weeks    Sinusitis     x week, pt reports a HX of sinus troubles/sinus issues but having pressure/pain noted to her cheeks , sinus drg    Concern For COVID-19     pt reports she woke up today and \"feels like she has Covid \" pt was tested 4/5 and was Neg. Pt would like tested again          Review of Systems   Constitutional: Positive for fatigue. Negative for activity change, appetite change, chills and fever. HENT: Positive for postnasal drip, sinus pressure and sinus pain (pt reports her cheeks and in under her eyes hurt and has pressure). Negative for congestion, drooling, ear pain, hearing loss, sore throat and trouble swallowing. Pt reports a HX of sinus issues and pain/pressure noted to her cheeks- requesting an ATB today       Eyes: Negative for redness, itching and visual disturbance. Respiratory: Negative for apnea, cough, chest tightness, shortness of breath and wheezing. Cardiovascular: Negative for chest pain. Gastrointestinal: Negative for abdominal distention, abdominal pain, anorexia, change in bowel habit, constipation, diarrhea, nausea and vomiting. Endocrine: Negative for heat intolerance. Genitourinary: Negative for difficulty urinating. Musculoskeletal: Positive for myalgias. Negative for gait problem and neck stiffness. Skin: Negative for rash. Neurological: Positive for headaches (pt denies it is the worst HA of life or thunderclap in appearance . Pt reports a HX of migraines and she will take her imitrex today). Negative for tremors, seizures and facial asymmetry. Hematological: Negative for adenopathy. Psychiatric/Behavioral: Negative for confusion. All other systems reviewed and are negative. Prior to Visit Medications    Medication Sig Taking? Authorizing Provider   azithromycin (ZITHROMAX) 250 MG tablet Take 2 tabs (500 mg) on Day 1, and take 1 tab (250 mg) on days 2 through 5.  Yes Smiley Price, APRN - CNP   ALPRAZolam (XANAX) 0.5 MG tablet Take 1 tablet by mouth 3 times daily as needed for Sleep or Anxiety for up to 90 days. Pepe Sanders MD   doxepin (SINEQUAN) 25 MG capsule Take 1 capsule by mouth 3 times daily as needed (prn)  Pepe Sanders MD   cyclobenzaprine (FLEXERIL) 10 MG tablet TAKE 1 TABLET BY MOUTH TWICE DAILY AS NEEDED FOR MUSCLE SPASMS  Pepe Sanders MD   SUMAtriptan (IMITREX) 100 MG tablet Take 1 tablet by mouth once as needed for Migraine  Luis Clement MD   meclizine (ANTIVERT) 25 MG tablet Take 1 tablet by mouth 2 times daily as needed for Dizziness  Pepe Sanders MD   dicyclomine (BENTYL) 10 MG capsule TAKE 2 CAPSULES BY MOUTH FOUR TIMES DAILY AS NEEDED  Pepe Sanders MD   Erenumab-aooe (AIMOVIG) 140 MG/ML SOAJ Inject 140 mg into the skin every 30 days  Luis Clement MD   estrogens, conjugated, (PREMARIN) 0.3 MG tablet TAKE 1 TABLET BY MOUTH DAILY  Pepe Sanders MD   docusate sodium (DOK) 100 MG capsule Take 2 capsules by mouth nightly  Patient not taking: Reported on 4/1/2021  ePpe Sanders MD   raloxifene (EVISTA) 60 MG tablet TAKE 1 TABLET BY MOUTH DAILY  Kiara Stevens MD   conjugated estrogens (PREMARIN) 0.625 MG/GM vaginal cream PLACE 0.5 GRAM VAGINALLY DAILY  Kiara Zavaleta MD   pantoprazole (PROTONIX) 40 MG tablet TAKE ONE TABLET BY MOUTH TWICE DAILY  Pepe Sanders MD   TOVIAZ 4 MG TB24 ER tablet TAKE 1 TABLET BY [address] [facility], MD   ipratropium (ATROVENT) 0.06 % nasal spray USE 2 SPRAYS IN EACH NOSTRIL THREE TIMES DAILY  Pepe Sanders MD   Azelastine-Fluticasone [facility]) 137-50 MCG/ACT SUSP 2 sprays by Nasal route 2 times daily as needed  Pepe Sanders MD   glycopyrrolate (ROBINUL) 2 MG tablet Take 1 tablet by mouth 2 times daily  Pepe Sanders MD   fluticasone (FLONASE) 50 MCG/ACT nasal spray USE 1 SPRAY IN EACH NOSTRIL DAILY  Pepe Sanders MD   Cetirizine HCl (ZYRTEC ALLERGY) 10 MG CAPS Take  by mouth daily. Historical Provider, MD   Naproxen Sodium (ALEVE) 220 MG CAPS Take  by mouth as needed.   Historical Provider, MD       Social History     Tobacco Use    Smoking status: Never Smoker    Smokeless tobacco: Never Used   Substance Use Topics    Alcohol use: Yes     Comment: 1 daily    Drug use: No        Allergies   Allergen Reactions    Bactrim [Sulfamethoxazole-Trimethoprim] Shortness Of Breath    Albumen, Egg Diarrhea    Macrobid [Nitrofurantoin Macrocrystal]      SOB   ,   Past Medical History:   Diagnosis Date    Anxiety     Irritable bowel syndrome with diarrhea     Migraine     Sciatica of right side 4/27/2017    Vertigo    ,   Past Surgical History:   Procedure Laterality Date    HYSTERECTOMY     ,   Social History     Tobacco Use    Smoking status: Never Smoker    Smokeless tobacco: Never Used   Substance Use Topics    Alcohol use: Yes     Comment: 1 daily    Drug use: No   , No family history on file.,   Immunization History   Administered Date(s) Administered    Influenza 10/17/2011    Influenza Virus Vaccine 10/09/2014    Influenza, High Dose (Fluzone 65 yrs and older) 10/16/2015, 10/25/2016, 09/24/2019    Influenza, Quadv, IM, (6 mo and older Fluzone, Flulaval, Fluarix and 3 yrs and older Afluria) 10/02/2017    Pneumococcal Conjugate 13-valent (Yevgeniy Aver) 09/12/2016, 10/10/2019    Td, unspecified formulation 04/27/2017       PHYSICAL EXAMINATION:  [ INSTRUCTIONS:  \"[x]\" Indicates a positive item  \"[]\" Indicates a negative item  -- DELETE ALL ITEMS NOT EXAMINED]  Vital Signs: (As obtained by patient/caregiver or practitioner observation)    Blood pressure-  Heart rate-    Respiratory rate-    Temperature-  Pulse oximetry-   Pt was unable to provide me with any VS today, denies any fever, chills.     Constitutional: [] Appears well-developed and well-nourished [] No apparent distress      [] Abnormal-   Mental status  [x] Alert and awake  [x] Oriented to person/place/time [x]Able to follow commands      Eyes:  EOM    []  Normal  [] Abnormal-  Sclera  []  Normal  [] Abnormal -         Discharge []  None visible  [] Abnormal -    HENT:   []

## 2021-04-16 ENCOUNTER — TELEPHONE (OUTPATIENT)
Dept: PRIMARY CARE CLINIC | Age: 76
End: 2021-04-16

## 2021-04-16 LAB
SARS-COV-2: NOT DETECTED
SOURCE: NORMAL

## 2021-05-11 DIAGNOSIS — R42 VERTIGO: ICD-10-CM

## 2021-05-11 RX ORDER — MECLIZINE HYDROCHLORIDE 25 MG/1
TABLET ORAL
Qty: 60 TABLET | Refills: 5 | Status: SHIPPED | OUTPATIENT
Start: 2021-05-11 | End: 2021-10-19

## 2021-05-11 NOTE — TELEPHONE ENCOUNTER
Pharmacy requesting medication refill.  Please approve or deny this request.    Rx requested:  Requested Prescriptions     Pending Prescriptions Disp Refills    meclizine (ANTIVERT) 25 MG tablet [Pharmacy Med Name: MECLIZINE 25MG RX TABLETS] 60 tablet 5     Sig: TAKE 1 TABLET BY MOUTH TWICE DAILY AS NEEDED FOR DIZZINESS         Last Office Visit:   4/1/2021      Next Visit Date:  Future Appointments   Date Time Provider Sonali Patel   5/21/2021  1:15 PM Jalil Lindsay, [address]

## 2021-05-12 ENCOUNTER — TELEPHONE (OUTPATIENT)
Dept: PRIMARY CARE CLINIC | Age: 76
End: 2021-05-12

## 2021-05-12 DIAGNOSIS — K64.9 ACUTE HEMORRHOID: Primary | ICD-10-CM

## 2021-05-12 RX ORDER — HYDROCORTISONE ACETATE 25 MG/1
25 SUPPOSITORY RECTAL 2 TIMES DAILY PRN
Qty: 12 SUPPOSITORY | Refills: 0 | Status: SHIPPED | OUTPATIENT
Start: 2021-05-12 | End: 2021-05-13

## 2021-05-13 ENCOUNTER — OFFICE VISIT (OUTPATIENT)
Dept: PRIMARY CARE CLINIC | Age: 76
End: 2021-05-13
Payer: MEDICARE

## 2021-05-13 VITALS
DIASTOLIC BLOOD PRESSURE: 60 MMHG | OXYGEN SATURATION: 95 % | WEIGHT: 173 LBS | BODY MASS INDEX: 30.65 KG/M2 | TEMPERATURE: 97.7 F | SYSTOLIC BLOOD PRESSURE: 130 MMHG | HEART RATE: 86 BPM | RESPIRATION RATE: 18 BRPM | HEIGHT: 63 IN

## 2021-05-13 DIAGNOSIS — K64.9 ACUTE HEMORRHOID: ICD-10-CM

## 2021-05-13 DIAGNOSIS — K59.00 CONSTIPATION, UNSPECIFIED CONSTIPATION TYPE: Primary | ICD-10-CM

## 2021-05-13 PROCEDURE — 99213 OFFICE O/P EST LOW 20 MIN: CPT | Performed by: NURSE PRACTITIONER

## 2021-05-13 RX ORDER — HYDROCORTISONE 25 MG/G
CREAM TOPICAL
Qty: 1 TUBE | Refills: 0 | Status: SHIPPED | OUTPATIENT
Start: 2021-05-13

## 2021-05-13 RX ORDER — DOCUSATE SODIUM 100 MG/1
100 CAPSULE, LIQUID FILLED ORAL 2 TIMES DAILY PRN
Qty: 60 CAPSULE | Refills: 0 | Status: SHIPPED | OUTPATIENT
Start: 2021-05-13 | End: 2021-06-12

## 2021-05-13 ASSESSMENT — ENCOUNTER SYMPTOMS
EYE DISCHARGE: 0
ABDOMINAL DISTENTION: 0
TROUBLE SWALLOWING: 0
RHINORRHEA: 0
NAUSEA: 0
DIARRHEA: 0
SHORTNESS OF BREATH: 0
CONSTIPATION: 0
EYE REDNESS: 0
CHEST TIGHTNESS: 0
BLOOD IN STOOL: 0
SORE THROAT: 0
ABDOMINAL PAIN: 0
VOMITING: 0
COUGH: 0
EYE PAIN: 0
SINUS PRESSURE: 0
ANAL BLEEDING: 0
RECTAL PAIN: 1
SINUS PAIN: 0
WHEEZING: 0

## 2021-05-13 NOTE — PROGRESS NOTES
DAILY 30 tablet 5    Azelastine-Fluticasone (DYMISTA) 137-50 MCG/ACT SUSP 2 sprays by Nasal route 2 times daily as needed 1 Bottle 9    glycopyrrolate (ROBINUL) 2 MG tablet Take 1 tablet by mouth 2 times daily 60 tablet 5    Cetirizine HCl (ZYRTEC ALLERGY) 10 MG CAPS Take  by mouth daily.  Naproxen Sodium (ALEVE) 220 MG CAPS Take  by mouth as needed.  SUMAtriptan (IMITREX) 100 MG tablet Take 1 tablet by mouth once as needed for Migraine 9 tablet 5    Erenumab-aooe (AIMOVIG) 140 MG/ML SOAJ Inject 140 mg into the skin every 30 days 1 pen 11     No current facility-administered medications for this visit. Review of Systems   Constitutional: Negative for activity change, appetite change, chills, diaphoresis, fatigue, fever and unexpected weight change. HENT: Negative for congestion, ear discharge, ear pain, rhinorrhea, sinus pressure, sinus pain, sore throat and trouble swallowing. Eyes: Negative for pain, discharge and redness. Respiratory: Negative for cough, chest tightness, shortness of breath and wheezing. Cardiovascular: Negative for chest pain. Gastrointestinal: Positive for rectal pain. Negative for abdominal distention, abdominal pain, anal bleeding, blood in stool, constipation, diarrhea, nausea and vomiting. Genitourinary: Negative for dysuria, frequency, pelvic pain, urgency and vaginal pain. Musculoskeletal: Negative for arthralgias and myalgias. Skin: Negative for rash. Neurological: Negative for weakness and headaches. Objective:   /60 (Site: Right Upper Arm, Position: Sitting, Cuff Size: Medium Adult)   Pulse 86   Temp 97.7 °F (36.5 °C) (Oral)   Resp 18   Ht 5' 3\" (1.6 m)   Wt 173 lb (78.5 kg)   SpO2 95%   BMI 30.65 kg/m²     Physical Exam  Vitals signs reviewed. Constitutional:       General: She is awake. Appearance: Normal appearance. She is well-developed, well-groomed and overweight.    HENT:      Head: Normocephalic and Cognition and Memory: Cognition and memory normal.         Judgment: Judgment normal.         Assessment:       Diagnosis Orders   1. Constipation, unspecified constipation type  docusate sodium (COLACE) 100 MG capsule   2. Acute hemorrhoid       No results found for this visit on 05/13/21. Plan:     Assessment & Plan   Kiran Barrera was seen today for hemorrhoids and health maintenance. Diagnoses and all orders for this visit:    Constipation, unspecified constipation type  -     docusate sodium (COLACE) 100 MG capsule; Take 1 capsule by mouth 2 times daily as needed for Constipation    Acute hemorrhoid  Discussed likely internal and external hemorrhoid although appearing that internal hemorrhoid is causing the pain. She has annusol HX sup to use and encouraged to try this. Will also give her some external cream and advised she can cont to use the tuck pads. Advised she may want to consider a sitz bath as well. Discussed constipation and while patient does not believe she is constipated she will try stool softner as well as increased fiber in diet and increase fluid intake. Advised with any worsening sx or if no better in 3-5 days need to call office. Discussed GI consult as it has been several years since her last colonoscopy and she has declined at this time. No orders of the defined types were placed in this encounter.     Orders Placed This Encounter   Medications    docusate sodium (COLACE) 100 MG capsule     Sig: Take 1 capsule by mouth 2 times daily as needed for Constipation     Dispense:  60 capsule     Refill:  0     Medications Discontinued During This Encounter   Medication Reason    azithromycin (ZITHROMAX) 250 MG tablet LIST CLEANUP    hydrocortisone (ANUSOL-HC) 25 MG suppository LIST CLEANUP    ipratropium (ATROVENT) 0.06 % nasal spray LIST CLEANUP    fluticasone (FLONASE) 50 MCG/ACT nasal spray LIST CLEANUP    pantoprazole (PROTONIX) 40 MG tablet LIST CLEANUP     Return for Call if no better in 1 week. .        Reviewed with the patient/family: current clinical status & medications. Side effects of the medication prescribed today, as well as treatment plan/rationale and result expectations have been discussed with the patient/family who expresses understanding. Patient will be discharged home in stable condition. Follow up with PCP to evaluate treatment results or return if symptoms worsen or fail to improve. Discussed signs and symptoms which require immediate follow-up in ED/call to 911. Understanding verbalized. I have reviewed the patient's medical history in detail and updated the computerized patient record.     Cooper Pink, APRN - CNP

## 2021-05-14 ENCOUNTER — TELEPHONE (OUTPATIENT)
Dept: PRIMARY CARE CLINIC | Age: 76
End: 2021-05-14

## 2021-05-14 NOTE — TELEPHONE ENCOUNTER
Cleave Hellier  Patient is calling in, everything you gave her she said is not working. She said she is in extreme pain still.   Uses [facility] in [place]  She said out of all the 5 medications, none of them are touching the pain  Please advise or call her back

## 2021-05-14 NOTE — TELEPHONE ENCOUNTER
My advice would be to be seen in the ER if she is in that much pain. They may need GI to take a look at her. There is not anything else that I can give for this issue if the supp and topical meds are not working. There may be something more GI can do.

## 2021-05-14 NOTE — TELEPHONE ENCOUNTER
Patient called in, informed her of message from Daphne Onofre and if no better she should go to ER to be seen. She states she is not sure what that will do, but she will go if not better.

## 2021-06-02 DIAGNOSIS — N95.1 MENOPAUSAL SYMPTOM: ICD-10-CM

## 2021-06-04 DIAGNOSIS — N95.1 MENOPAUSAL SYMPTOM: ICD-10-CM

## 2021-06-05 DIAGNOSIS — N95.1 MENOPAUSAL SYMPTOM: ICD-10-CM

## 2021-06-14 DIAGNOSIS — G43.001 MIGRAINE WITHOUT AURA AND WITH STATUS MIGRAINOSUS, NOT INTRACTABLE: ICD-10-CM

## 2021-06-14 RX ORDER — SUMATRIPTAN 100 MG/1
100 TABLET, FILM COATED ORAL
Qty: 9 TABLET | Refills: 5 | Status: SHIPPED | OUTPATIENT
Start: 2021-06-14 | End: 2022-08-09

## 2021-06-23 NOTE — TELEPHONE ENCOUNTER
[facility] [place], [address]  Phone [phone]   Fax [phone]      OFFICE VISIT: 2021    Sonny Potter Neville-: 1973      HPI  Reason For Visit:  Sonny Potter is a 46 y.o. Establish Care (No longer pleased with previous PCP. ) and Knee Pain (pain in right knee for the past month, he has been wearing a knee brace. When he takes the brace off it feels weak. He remembers a bruise on the inner part of his knee but does not remember any injury. )      Patient presents to [facility]. He complains of right knee pain for the past month or so. He has been wearing a knee brace which does help some. He notes that when he is not wearing the brace is knee feels \"weak\". No specific injury that he can recall. He does note that he had a bruise on the medial aspect of his knee. He has had multiple traumas to his knee over years. Treatment:   Over-the-counter ibuprofen. Imaging studies: none      Vitamin D deficiency:  Vitamin D level was 8.5 on 2021      Hyperglycemia:  Blood sugar was elevated on 2021 however hemoglobin A1c was 5. 3.     height is 5' 9\" (1.753 m) and weight is 173 lb 8 oz (78.7 kg). His temporal temperature is 97.6 °F (36.4 °C). His blood pressure is 122/82 and his pulse is 74. His oxygen saturation is 97%. Body mass index is 25.62 kg/m².     I have reviewed the following with the Mr. Michell Nguyen   Lab Review  Orders Only on 2021   Component Date Value    Hemoglobin A1C 2021 5.3     Cholesterol, Total 2021 209*    Triglycerides 2021 158*    HDL 2021 59     LDL Calculated 2021 118     TSH Reflex FT4 2021 1.09     WBC 2021 8.4     RBC 2021 5.58     Hemoglobin 2021 17.7     Hematocrit 2021 52.2*    MCV 2021 93.5     MCH 2021 31.7*    MCHC 2021 33.9     RDW 2021 13.2     Platelets  235     MPV 2021 9.8     Vit D, 25-Hydroxy Patient is requesting medication refill. Please approve or deny this request.    Rx requested:  Requested Prescriptions     Pending Prescriptions Disp Refills    SUMAtriptan (IMITREX) 100 MG tablet 9 tablet 5     Sig: Take 1 tablet by mouth once as needed for Migraine         Last Office Visit:   5/26/2020      Next Visit Date:  No future appointments. 02/04/2021 8.5*    Hep C Ab Interp 02/04/2021 Non-Reactive     Total Protein 02/04/2021 7.5     Albumin 02/04/2021 4.7     Alkaline Phosphatase 02/04/2021 95     ALT 02/04/2021 16     AST 02/04/2021 20     Total Bilirubin 02/04/2021 0.8     Bilirubin, Direct 02/04/2021 0.2     Bilirubin, Indirect 02/04/2021 0.6     Sodium 02/04/2021 141     Potassium 02/04/2021 3.9     Chloride 02/04/2021 104     CO2 02/04/2021 23     Anion Gap 02/04/2021 14     Glucose 02/04/2021 127*    BUN 02/04/2021 9     CREATININE 02/04/2021 0.7     GFR Non- 02/04/2021 >60     GFR  02/04/2021 >59     Calcium 02/04/2021 9.3     Rapid HIV 1&2 02/04/2021 Non-reactive     HIV-1 P24 Ag 02/04/2021 Non-reactive      Copies of these are in the chart. Current Outpatient Medications   Medication Sig Dispense Refill    nabumetone (RELAFEN) 750 MG tablet Take 1 tablet by mouth 2 times daily Take with food or milk 60 tablet 3    ibuprofen (ADVIL;MOTRIN) 200 MG tablet Take 200 mg by mouth every 6 hours as needed for Pain       No current facility-administered medications for this visit. Allergies: Patient has no known allergies.      Past Medical History:   Diagnosis Date    Inguinal hernia bilateral, non-recurrent     Non-recurrent bilateral inguinal hernia without obstruction or gangrene 6/10/2020       Family History   Adopted: Yes   Problem Relation Age of Onset    Diabetes Brother        Past Surgical History:   Procedure Laterality Date    DENTAL SURGERY      all upper teeth removed    HERNIA REPAIR Bilateral 6/10/2020    LAPAROSCOPIC BILATERAL INGUINAL HERNIA REPAIR WITH MESH performed by Thais Zabala MD at [facility] OR       Social History     Tobacco Use    Smoking status: Current Every Day Smoker     Packs/day: 1.00     Years: 32.00     Pack years: 32.00     Types: Cigarettes     Start date: 1989    Smokeless tobacco: Never Used    Tobacco comment: Work at [employer] Substance Use Topics    Alcohol use: Yes     Alcohol/week: 6.0 standard drinks     Types: 6 Cans of beer per week     Comment: 6 pack per night        Review of Systems   Constitutional: Negative. HENT: Negative. Eyes: Negative. Respiratory: Negative. Cardiovascular: Negative. Gastrointestinal: Negative. Endocrine: Negative. Genitourinary: Negative. Musculoskeletal: Positive for arthralgias (right knee). Skin: Negative. Allergic/Immunologic: Negative. Neurological: Negative. Hematological: Negative. Psychiatric/Behavioral: Negative. Physical Exam  Vitals and nursing note reviewed. Constitutional:       General: He is not in acute distress. Appearance: He is well-developed. HENT:      Head: Normocephalic and atraumatic. Right Ear: External ear normal.      Left Ear: External ear normal.      Nose: Nose normal.      Mouth/Throat:      Pharynx: No oropharyngeal exudate. Eyes:      General:         Right eye: No discharge. Left eye: No discharge. Conjunctiva/sclera: Conjunctivae normal.   Neck:      Thyroid: No thyromegaly. Vascular: No JVD. Cardiovascular:      Rate and Rhythm: Normal rate and regular rhythm. Heart sounds: Normal heart sounds. No murmur heard. No friction rub. No gallop. Pulmonary:      Effort: Pulmonary effort is normal. No respiratory distress. Breath sounds: Normal breath sounds. No wheezing or rales. Abdominal:      General: Bowel sounds are normal. There is no distension. Palpations: Abdomen is soft. There is no mass. Tenderness: There is no abdominal tenderness. Musculoskeletal:         General: Tenderness present. No deformity. Injury: right knee. Normal range of motion. Cervical back: Normal range of motion and neck supple. Right lower leg: No edema. Left lower leg: No edema. Comments: No ligamentous laxity noted.    Lymphadenopathy:      Cervical: No cervical adenopathy. Skin:     General: Skin is warm and dry. Findings: No rash. Neurological:      General: No focal deficit present. Mental Status: He is alert and oriented to person, place, and time. Motor: No abnormal muscle tone. Psychiatric:         Mood and Affect: Mood normal.         Behavior: Behavior normal.             ASSESSMENT      ICD-10-CM    1. Lateral epicondylitis of right elbow  M77.11 nabumetone (RELAFEN) 750 MG tablet   2. Chronic pain of right knee  M25.561 XR KNEE RIGHT (3 VIEWS)    G89.29 nabumetone (RELAFEN) 750 MG tablet         PLAN    1. Lateral epicondylitis of right elbow  Recommend a brace to stabilize the wrist extensors. 2. Chronic pain of right knee  Will check xrays and consider mri depending on what we see. - XR KNEE RIGHT (3 VIEWS); Future      Orders Placed This Encounter   Procedures    XR KNEE RIGHT (3 VIEWS)        Return in about 1 month (around 7/23/2021) for 30. This was an in-house visit.

## 2021-07-22 DIAGNOSIS — R42 VERTIGO: ICD-10-CM

## 2021-07-22 DIAGNOSIS — F41.9 ANXIETY: ICD-10-CM

## 2021-07-22 RX ORDER — ALPRAZOLAM 0.5 MG/1
TABLET ORAL
Qty: 90 TABLET | Refills: 0 | Status: SHIPPED | OUTPATIENT
Start: 2021-07-22 | End: 2021-09-15

## 2021-07-22 NOTE — TELEPHONE ENCOUNTER
Pharmacy requesting medication refill. Please approve or deny this request.    Rx requested:  Requested Prescriptions     Pending Prescriptions Disp Refills    ALPRAZolam (XANAX) 0.5 MG tablet [Pharmacy Med Name: ALPRAZOLAM 0.5MG TABLETS] 90 tablet      Sig: TAKE 1 TABLET BY MOUTH THREE TIMES DAILY AS NEEDED FOR SLEEP OR ANXIETY         Last Office Visit:   5/24/2021      Next Visit Date:  No future appointments.

## 2021-08-07 DIAGNOSIS — M54.31 SCIATICA OF RIGHT SIDE: ICD-10-CM

## 2021-08-07 DIAGNOSIS — R25.2 MUSCLE CRAMPS: ICD-10-CM

## 2021-08-09 RX ORDER — CYCLOBENZAPRINE HCL 10 MG
TABLET ORAL
Qty: 60 TABLET | Refills: 0 | Status: SHIPPED | OUTPATIENT
Start: 2021-08-09 | End: 2021-09-03 | Stop reason: SDUPTHER

## 2021-08-09 NOTE — TELEPHONE ENCOUNTER
Requested Prescriptions     Signed Prescriptions Disp Refills    cyclobenzaprine (FLEXERIL) 10 MG tablet 60 tablet 0     Sig: TAKE 1 TABLET BY MOUTH TWICE DAILY AS NEEDED FOR MUSCLE SPASMS     Authorizing Provider: Celso Alanis

## 2021-09-03 DIAGNOSIS — M54.31 SCIATICA OF RIGHT SIDE: ICD-10-CM

## 2021-09-03 DIAGNOSIS — R25.2 MUSCLE CRAMPS: ICD-10-CM

## 2021-09-03 RX ORDER — CYCLOBENZAPRINE HCL 10 MG
10 TABLET ORAL 2 TIMES DAILY PRN
Qty: 60 TABLET | Refills: 0 | Status: SHIPPED | OUTPATIENT
Start: 2021-09-03 | End: 2021-09-27

## 2021-09-27 DIAGNOSIS — M54.31 SCIATICA OF RIGHT SIDE: ICD-10-CM

## 2021-09-27 DIAGNOSIS — R25.2 MUSCLE CRAMPS: ICD-10-CM

## 2021-09-27 RX ORDER — CYCLOBENZAPRINE HCL 10 MG
TABLET ORAL
Qty: 60 TABLET | Refills: 0 | Status: SHIPPED | OUTPATIENT
Start: 2021-09-27 | End: 2021-10-26

## 2021-10-07 ENCOUNTER — NURSE TRIAGE (OUTPATIENT)
Dept: OTHER | Facility: CLINIC | Age: 76
End: 2021-10-07

## 2021-10-07 ENCOUNTER — VIRTUAL VISIT (OUTPATIENT)
Dept: PRIMARY CARE CLINIC | Age: 76
End: 2021-10-07
Payer: MEDICARE

## 2021-10-07 DIAGNOSIS — H93.19 TINNITUS, UNSPECIFIED LATERALITY: Primary | ICD-10-CM

## 2021-10-07 DIAGNOSIS — H93.19 TINNITUS, UNSPECIFIED LATERALITY: ICD-10-CM

## 2021-10-07 DIAGNOSIS — R42 VERTIGO: ICD-10-CM

## 2021-10-07 PROCEDURE — 99442 PR PHYS/QHP TELEPHONE EVALUATION 11-20 MIN: CPT | Performed by: INTERNAL MEDICINE

## 2021-10-07 RX ORDER — HYDROCHLOROTHIAZIDE 25 MG/1
25 TABLET ORAL EVERY MORNING
Qty: 30 TABLET | Refills: 5 | Status: SHIPPED | OUTPATIENT
Start: 2021-10-07 | End: 2021-10-08

## 2021-10-07 RX ORDER — MECLIZINE HYDROCHLORIDE 25 MG/1
TABLET ORAL
Qty: 90 TABLET | Refills: 5 | Status: SHIPPED | OUTPATIENT
Start: 2021-10-07 | End: 2022-04-12

## 2021-10-07 NOTE — PROGRESS NOTES
Phone  Alvaro Mason is a 64 y.o. female evaluated via telephone on 10/7/2021. Alvaro Mason 64 y.o. female presents today with No chief complaint on file. Dizziness  This is a recurrent problem. The current episode started more than 1 year ago. The problem occurs daily. The problem has been waxing and waning. Associated symptoms include vertigo. Pertinent negatives include no chest pain, coughing, fever, joint swelling or rash. Past Medical History:   Diagnosis Date    Anxiety     Irritable bowel syndrome with diarrhea     Migraine     Sciatica of right side 4/27/2017    Vertigo      Patient Active Problem List    Diagnosis Date Noted    Migraine without aura and with status migrainosus, not intractable 05/22/2020    Dizziness and giddiness 05/22/2020    Benign paroxysmal vertigo of both ears 05/22/2020    Closed fracture of right distal fibula 09/05/2018    Acute right-sided low back pain with right-sided sciatica 04/27/2017    Sciatica of right side 04/27/2017     Past Surgical History:   Procedure Laterality Date    HYSTERECTOMY       No family history on file.   Social History     Socioeconomic History    Marital status:      Spouse name: Not on file    Number of children: Not on file    Years of education: Not on file    Highest education level: Not on file   Occupational History    Not on file   Tobacco Use    Smoking status: Never Smoker    Smokeless tobacco: Never Used   Substance and Sexual Activity    Alcohol use: Yes     Comment: 1 daily    Drug use: No    Sexual activity: Not on file   Other Topics Concern    Not on file   Social History Narrative    Not on file     Social Determinants of Health     Financial Resource Strain: Low Risk     Difficulty of Paying Living Expenses: Not hard at all   Food Insecurity: No Food Insecurity    Worried About [address] [facility] in the Last Year: Never true    [facility] in the Last Year: Never true Transportation Needs: No Transportation Needs    Lack of Transportation (Medical): No    Lack of Transportation (Non-Medical): No   Physical Activity:     Days of Exercise per Week:     Minutes of Exercise per Session:    Stress:     Feeling of Stress :    Social Connections:     Frequency of Communication with Friends and Family:     Frequency of Social Gatherings with Friends and Family:     Attends Voodoo Services:     Active Member of Clubs or Organizations:     Attends Club or Organization Meetings:     Marital Status:    Intimate Partner Violence:     Fear of Current or Ex-Partner:     Emotionally Abused:     Physically Abused:     Sexually Abused: Allergies   Allergen Reactions    Bactrim [Sulfamethoxazole-Trimethoprim] Shortness Of Breath    Albumen, Egg Diarrhea    Macrobid [Nitrofurantoin Macrocrystal]      SOB       Review of Systems   Constitutional: Negative for fever. HENT: Positive for tinnitus. Eyes: Negative for itching and visual disturbance. Respiratory: Negative for cough. Cardiovascular: Negative for chest pain and palpitations. Gastrointestinal: Negative for abdominal distention and blood in stool. Genitourinary: Negative for dysuria. Musculoskeletal: Negative for gait problem and joint swelling. Skin: Negative for rash. Neurological: Positive for dizziness, vertigo and light-headedness. Negative for syncope. Psychiatric/Behavioral: Negative for hallucinations and suicidal ideas. There were no vitals filed for this visit. Physical Exam  Neurological:      Mental Status: She is alert. Assessment/Plan  Diagnoses and all orders for this visit:    Tinnitus, unspecified laterality  -     Discontinue: hydroCHLOROthiazide (HYDRODIURIL) 25 MG tablet;  Take 1 tablet by mouth every morning  -     meclizine (ANTIVERT) 25 MG tablet; TAKE 1 TABLET BY MOUTH 3 times DAILY AS NEEDED FOR DIZZINESS    Vertigo  -     Discontinue: hydroCHLOROthiazide (HYDRODIURIL) 25 MG tablet; Take 1 tablet by mouth every morning  -     meclizine (ANTIVERT) 25 MG tablet; TAKE 1 TABLET BY MOUTH 3 times DAILY AS NEEDED FOR DIZZINESS        Return in about 3 months (around 1/7/2022), or if symptoms worsen or fail to improve. Noemi Jason MD    Consent:  She and/or health care decision maker is aware that that she may receive a bill for this telephone service, depending on her insurance coverage, and has provided verbal consent to proceed: Yes      Documentation:  I communicated with the patient and/or health care decision maker about vertigo. Details of this discussion including any medical advice provided: meds      I affirm this is a Patient Initiated Episode with a Patient who has not had a related appointment within my department in the past 7 days or scheduled within the next 24 hours. Patient identification was verified at the start of the visit: Yes    Total Time: minutes: 11-20 minutes    The visit was conducted pursuant to the emergency declaration under the [address] [place] [address], [address] [place] [address] authority and the [email or web address] General Act. Patient identification was verified, and a caregiver was present when appropriate. The patient was located in a state where the provider was credentialed to provide care.     Note: not billable if this call serves to triage the patient into an appointment for the relevant concern      Noemi Jason MD

## 2021-10-07 NOTE — TELEPHONE ENCOUNTER
[facility]. Brief description of triage: Cisco Gonzales is having dizziness and buzzing in her right ear. Caller states she is unsteady on her feet. Caller states the buzzing is mainly constant, but did seem to calm down while sleeping. Caller denies any other symptoms at this time. Triage indicates for patient to See in office today. Caller is aware she needs to go to C/ED if unable to get into office today. Care advice provided, patient verbalizes understanding; denies any other questions or concerns; instructed to call back for any new or worsening symptoms. Writer provided warm transfer to [facility] for appointment scheduling. Attention Provider: Thank you for allowing me to participate in the care of your patient. The patient was connected to triage in response to information provided to the [facility]. Please do not respond through this encounter as the response is not directed to a shared pool. Reason for Disposition   Patient wants to be seen    Answer Assessment - Initial Assessment Questions  1. DESCRIPTION: \"Describe your dizziness. \"      More dizzy than normal. Buzzing in right ear. I am unsteady on my feet. 2. LIGHTHEADED: \"Do you feel lightheaded? \" (e.g., somewhat faint, woozy, weak upon standing)      In general, not always when I stand up    3. VERTIGO: \"Do you feel like either you or the room is spinning or tilting? \" (i.e. vertigo)      No    4. SEVERITY: \"How bad is it? \"  \"Do you feel like you are going to faint? \" \"Can you stand and walk? \"    - MILD - walking normally    - MODERATE - interferes with normal activities (e.g., work, school)     - SEVERE - unable to stand, requires support to walk, feels like passing out now. Moderate- severe    5. ONSET:  \"When did the dizziness begin? \"      About 2 weeks ago    6. AGGRAVATING FACTORS: \"Does anything make it worse? \" (e.g., standing, change in head position)      It is better when I am sleeping, when I am up in the middle of the night it not happening, it is when I get up and move around. 7. HEART RATE: \"Can you tell me your heart rate? \" \"How many beats in 15 seconds? \"  (Note: not all patients can do this)        No    8. CAUSE: \"What do you think is causing the dizziness? \"      Unsure    9. RECURRENT SYMPTOM: \"Have you had dizziness before? \" If so, ask: \"When was the last time? \" \"What happened that time? \"      Yes - had the buzzing in my ears- have had medications before. But this time it is worse and not getting better    10. OTHER SYMPTOMS: \"Do you have any other symptoms? \" (e.g., fever, chest pain, vomiting, diarrhea, bleeding)        None    11. PREGNANCY: \"Is there any chance you are pregnant? \" \"When was your last menstrual period? \"        n/a    Protocols used: [id number]

## 2021-10-08 RX ORDER — HYDROCHLOROTHIAZIDE 25 MG/1
25 TABLET ORAL EVERY MORNING
Qty: 90 TABLET | Refills: 1 | Status: SHIPPED | OUTPATIENT
Start: 2021-10-08

## 2021-10-19 DIAGNOSIS — R42 VERTIGO: ICD-10-CM

## 2021-10-19 RX ORDER — MECLIZINE HYDROCHLORIDE 25 MG/1
TABLET ORAL
Qty: 60 TABLET | Refills: 5 | Status: SHIPPED | OUTPATIENT
Start: 2021-10-19

## 2021-10-19 ASSESSMENT — ENCOUNTER SYMPTOMS
ABDOMINAL DISTENTION: 0
EYE ITCHING: 0
COUGH: 0
BLOOD IN STOOL: 0

## 2021-10-26 DIAGNOSIS — R25.2 MUSCLE CRAMPS: ICD-10-CM

## 2021-10-26 DIAGNOSIS — M54.31 SCIATICA OF RIGHT SIDE: ICD-10-CM

## 2021-10-26 RX ORDER — CYCLOBENZAPRINE HCL 10 MG
TABLET ORAL
Qty: 60 TABLET | Refills: 0 | Status: SHIPPED | OUTPATIENT
Start: 2021-10-26 | End: 2021-11-29

## 2021-11-06 DIAGNOSIS — F41.9 ANXIETY: ICD-10-CM

## 2021-11-06 DIAGNOSIS — R42 VERTIGO: ICD-10-CM

## 2021-11-07 RX ORDER — ALPRAZOLAM 0.5 MG/1
TABLET ORAL
Qty: 90 TABLET | Refills: 0 | Status: SHIPPED | OUTPATIENT
Start: 2021-11-07 | End: 2022-01-23

## 2021-11-28 DIAGNOSIS — R25.2 MUSCLE CRAMPS: ICD-10-CM

## 2021-11-28 DIAGNOSIS — M54.31 SCIATICA OF RIGHT SIDE: ICD-10-CM

## 2021-11-29 DIAGNOSIS — K58.0 IRRITABLE BOWEL SYNDROME WITH DIARRHEA: ICD-10-CM

## 2021-11-29 DIAGNOSIS — R25.2 MUSCLE CRAMPS: ICD-10-CM

## 2021-11-29 DIAGNOSIS — M54.31 SCIATICA OF RIGHT SIDE: ICD-10-CM

## 2021-11-29 RX ORDER — CYCLOBENZAPRINE HCL 10 MG
TABLET ORAL
Qty: 60 TABLET | Refills: 0 | Status: SHIPPED | OUTPATIENT
Start: 2021-11-29 | End: 2022-02-16

## 2021-11-29 RX ORDER — DICYCLOMINE HYDROCHLORIDE 10 MG/1
CAPSULE ORAL
Qty: 120 CAPSULE | Refills: 5 | Status: SHIPPED | OUTPATIENT
Start: 2021-11-29

## 2021-11-29 RX ORDER — CYCLOBENZAPRINE HCL 10 MG
TABLET ORAL
Qty: 60 TABLET | Refills: 0 | Status: SHIPPED | OUTPATIENT
Start: 2021-11-29 | End: 2021-12-29

## 2021-12-28 DIAGNOSIS — M54.31 SCIATICA OF RIGHT SIDE: ICD-10-CM

## 2021-12-28 DIAGNOSIS — R25.2 MUSCLE CRAMPS: ICD-10-CM

## 2021-12-29 RX ORDER — CYCLOBENZAPRINE HCL 10 MG
TABLET ORAL
Qty: 60 TABLET | Refills: 0 | Status: SHIPPED | OUTPATIENT
Start: 2021-12-29

## 2022-02-06 DIAGNOSIS — N95.1 MENOPAUSAL SYMPTOM: ICD-10-CM

## 2022-02-16 DIAGNOSIS — M54.31 SCIATICA OF RIGHT SIDE: ICD-10-CM

## 2022-02-16 DIAGNOSIS — R25.2 MUSCLE CRAMPS: ICD-10-CM

## 2022-02-16 RX ORDER — CYCLOBENZAPRINE HCL 10 MG
TABLET ORAL
Qty: 60 TABLET | Refills: 0 | Status: SHIPPED | OUTPATIENT
Start: 2022-02-16 | End: 2022-03-21

## 2022-02-28 ENCOUNTER — TELEMEDICINE (OUTPATIENT)
Dept: PRIMARY CARE CLINIC | Age: 77
End: 2022-02-28
Payer: MEDICARE

## 2022-02-28 DIAGNOSIS — Z00.00 MEDICARE ANNUAL WELLNESS VISIT, SUBSEQUENT: Primary | ICD-10-CM

## 2022-02-28 PROCEDURE — 4040F PNEUMOC VAC/ADMIN/RCVD: CPT | Performed by: NURSE PRACTITIONER

## 2022-02-28 PROCEDURE — G8484 FLU IMMUNIZE NO ADMIN: HCPCS | Performed by: NURSE PRACTITIONER

## 2022-02-28 PROCEDURE — 1123F ACP DISCUSS/DSCN MKR DOCD: CPT | Performed by: NURSE PRACTITIONER

## 2022-02-28 PROCEDURE — G0439 PPPS, SUBSEQ VISIT: HCPCS | Performed by: NURSE PRACTITIONER

## 2022-02-28 ASSESSMENT — PATIENT HEALTH QUESTIONNAIRE - PHQ9
2. FEELING DOWN, DEPRESSED OR HOPELESS: 0
SUM OF ALL RESPONSES TO PHQ QUESTIONS 1-9: 0
1. LITTLE INTEREST OR PLEASURE IN DOING THINGS: 0
SUM OF ALL RESPONSES TO PHQ9 QUESTIONS 1 & 2: 0
SUM OF ALL RESPONSES TO PHQ QUESTIONS 1-9: 0

## 2022-02-28 ASSESSMENT — LIFESTYLE VARIABLES
HOW OFTEN DO YOU HAVE A DRINK CONTAINING ALCOHOL: MONTHLY OR LESS
HOW MANY STANDARD DRINKS CONTAINING ALCOHOL DO YOU HAVE ON A TYPICAL DAY: 1 OR 2

## 2022-02-28 NOTE — PROGRESS NOTES
Medicare Annual Wellness Visit    Jill Griffin is here for Medicare AWV (2022)    Assessment & Plan   Griselda Liu was seen today for medicare awv. Diagnoses and all orders for this visit:    Medicare annual wellness visit, subsequent         Recommendations for Preventive Services Due: see orders and patient instructions/AVS.  Recommended screening schedule for the next 5-10 years is provided to the patient in written form: see Patient Instructions/AVS.     Return for Medicare Annual Wellness Visit in 1 year. Subjective     Pt reports no issus at this time besides placing her  into hospice at this time due to him having Dementia and not eating. Patient's complete Health Risk Assessment and screening values have been reviewed and are found in Flowsheets. The following problems were reviewed today and where indicated follow up appointments were made and/or referrals ordered.     Positive Risk Factor Screenings with Interventions:               General Health and ACP:  General  In general, how would you say your health is?: Excellent  In the past 7 days, have you experienced any of the following: New or Increased Pain, New or Increased Fatigue, Loneliness, Social Isolation, Stress or Anger?: No  Do you get the social and emotional support that you need?: Yes  Do you have a Living Will?: Yes    Advance Directives     Power of  Living Will ACP-Advance Directive ACP-Power of     Not on File Not on File Not on File Not on File      General Health Risk Interventions:  · no issues    Health Habits/Nutrition:     Physical Activity: Inactive    Days of Exercise per Week: 0 days    Minutes of Exercise per Session: 0 min     Have you lost any weight without trying in the past 3 months?: No     Have you seen the dentist within the past year?: Yes    Health Habits/Nutrition Interventions:  · Inadequate physical activity:  patient is not ready to increase his/her physical activity level at this time      ADLs:  In the past 7 days, did you need help from others to perform any of the following everyday activities: Eating, dressing, grooming, bathing, toileting, or walking/balance?: No  In the past 7 days, did you need help from others to take care of any of the following: Laundry, housekeeping, banking/finances, shopping, telephone use, food preparation, transportation, or taking medications?: (!) Yes  Select all that apply: Justin Jean Baptiste (pt reports she has a  to help her)    ADL Interventions:  · Patient declines any further evaluation/treatment for this issue          Objective      Patient-Reported Vitals  BP Observations: No, remote/electronic monitoring device was not used or able to be verified  Patient-Reported Weight: 170 lbs  Patient-Reported Height: 5ft3         Allergies   Allergen Reactions    Bactrim [Sulfamethoxazole-Trimethoprim] Shortness Of Breath    Albumen, Egg Diarrhea    Macrobid [Nitrofurantoin Macrocrystal]      SOB     Prior to Visit Medications    Medication Sig Taking? Authorizing Provider   cyclobenzaprine (FLEXERIL) 10 MG tablet TAKE 1 TABLET BY MOUTH TWICE DAILY AS NEEDED FOR MUSCLE SPASMS Yes Jalil Lindsay MD   estrogens, conjugated, (PREMARIN) 0.3 MG tablet TAKE 1 TABLET BY MOUTH DAILY Yes Jalil Lindsay MD   cyclobenzaprine (FLEXERIL) 10 MG tablet TAKE 1 TABLET BY MOUTH TWICE DAILY AS NEEDED FOR MUSCLE SPASMS Yes Jalil Lindsay MD   dicyclomine (BENTYL) 10 MG capsule TAKE 2 CAPSULES BY MOUTH FOUR TIMES DAILY AS NEEDED Yes Jalil Lindsay MD   meclizine (ANTIVERT) 25 MG tablet TAKE 1 TABLET BY MOUTH TWICE DAILY AS NEEDED FOR DIZZINESS Yes Jalil Lindsay MD   hydroCHLOROthiazide (HYDRODIURIL) 25 MG tablet TAKE 1 TABLET BY MOUTH EVERY MORNING Yes Jalil Lindsay MD   meclizine (ANTIVERT) 25 MG tablet TAKE 1 TABLET BY MOUTH 3 times DAILY AS NEEDED FOR DIZZINESS Yes Jalil Lindsay MD   hydrocortisone (PROCTOZONE-HC) 2.5 % CREA rectal cream Use QID to rectal area.  Yes Alexandria Landrum LOLA CastrejonN - CNP   doxepin (SINEQUAN) 25 MG capsule Take 1 capsule by mouth 3 times daily as needed (prn) Yes Harpal Boo MD   docusate sodium (DOK) 100 MG capsule Take 2 capsules by mouth nightly Yes Harpal Boo MD   raloxifene (EVISTA) 60 MG tablet TAKE 1 TABLET BY MOUTH DAILY Yes Linda Gardner MD   conjugated estrogens (PREMARIN) 0.625 MG/GM vaginal cream PLACE 0.5 GRAM VAGINALLY DAILY Yes Linda Gardner MD   TOVIAZ 4 MG TB24 ER tablet TAKE 1 TABLET BY MOUTH DAILY Yes Harpal Boo MD   Azelastine-Fluticasone [facility]) 137-50 MCG/ACT SUSP 2 sprays by Nasal route 2 times daily as needed Yes Harpal Boo MD   glycopyrrolate (ROBINUL) 2 MG tablet Take 1 tablet by mouth 2 times daily Yes Harpal Boo MD   Cetirizine HCl (ZYRTEC ALLERGY) 10 MG CAPS Take  by mouth daily. Yes Historical Provider, MD   Naproxen Sodium (ALEVE) 220 MG CAPS Take  by mouth as needed. Yes Historical Provider, MD   SUMAtriptan (IMITREX) 100 MG tablet Take 1 tablet by mouth once as needed for Migraine  Maira Barillas MD   Brenda Priestly (AIMOVIG) 140 MG/ML SOAJ Inject 140 mg into the skin every 30 days  Jacob Silverio MD       [facility] (Including outside providers/suppliers regularly involved in providing care):   Patient Care Team:  Harpal Boo MD as PCP - General (Internal Medicine)  Harpal Boo MD as PCP - [facility] Empaneled Provider  Shakila Fagan MD    Reviewed and updated this visit:  Tobacco  Allergies  Meds  Problems  Med Hx  Surg Hx  Soc Hx  Fam Hx             Adriana Benavides, was evaluated through a synchronous (real-time) audio-video encounter. The patient (or guardian if applicable) is aware that this is a billable service, which includes applicable co-pays. This Virtual Visit was conducted with patient's (and/or legal guardian's) consent.  The visit was conducted pursuant to the emergency declaration under the [facility] [address] waiver authority and the [facility] Response Supplemental Appropriations Act. Patient identification was verified, and a caregiver was present when appropriate. The patient was located at home in a state where the provider was licensed to provide care.

## 2022-02-28 NOTE — PATIENT INSTRUCTIONS
Personalized Preventive Plan for Licha Hayes - 2/28/2022  Medicare offers a range of preventive health benefits. Some of the tests and screenings are paid in full while other may be subject to a deductible, co-insurance, and/or copay. Some of these benefits include a comprehensive review of your medical history including lifestyle, illnesses that may run in your family, and various assessments and screenings as appropriate. After reviewing your medical record and screening and assessments performed today your provider may have ordered immunizations, labs, imaging, and/or referrals for you. A list of these orders (if applicable) as well as your Preventive Care list are included within your After Visit Summary for your review. Other Preventive Recommendations:    · A preventive eye exam performed by an eye specialist is recommended every 1-2 years to screen for glaucoma; cataracts, macular degeneration, and other eye disorders. · A preventive dental visit is recommended every 6 months. · Try to get at least 150 minutes of exercise per week or 10,000 steps per day on a pedometer . · Order or download the FREE \"Exercise & Physical Activity: Your Everyday Guide\" from [employer]. Call 6-712.698.4027 or search [employer] online. · You need 9359-3518 mg of calcium and 8148-6177 IU of vitamin D per day. It is possible to meet your calcium requirement with diet alone, but a vitamin D supplement is usually necessary to meet this goal.  · When exposed to the sun, use a sunscreen that protects against both UVA and UVB radiation with an SPF of 30 or greater. Reapply every 2 to 3 hours or after sweating, drying off with a towel, or swimming. · Always wear a seat belt when traveling in a car. Always wear a helmet when riding a bicycle or motorcycle. Patient Education        Well Visit, Over 72: Care Instructions  Overview     Well visits can help you stay healthy.  Your doctor has checked your overall health and may have suggested ways to take good care of yourself. Your doctor also may have recommended tests. At home, you can help prevent illness with healthy eating, regular exercise, and other steps. Follow-up care is a key part of your treatment and safety. Be sure to make and go to all appointments, and call your doctor if you are having problems. It's also a good idea to know your test results and keep a list of the medicines you take. How can you care for yourself at home? Get screening tests that you and your doctor decide on. Screening helps find diseases before any symptoms appear. Eat healthy foods. Choose fruits, vegetables, whole grains, protein, and low-fat dairy foods. Limit fat, especially saturated fat. Reduce salt in your diet. Limit alcohol. If you are a man, have no more than 2 drinks a day or 14 drinks a week. If you are a woman, have no more than 1 drink a day or 7 drinks a week. Since alcohol affects older adults differently, you may want to limit alcohol even more. Or you may not want to drink at all. Get at least 30 minutes of exercise on most days of the week. Walking is a good choice. You also may want to do other activities, such as running, swimming, cycling, or playing tennis or team sports. Reach and stay at a healthy weight. This will lower your risk for many problems, such as obesity, diabetes, heart disease, and high blood pressure. Do not smoke. Smoking can make health problems worse. If you need help quitting, talk to your doctor about stop-smoking programs and medicines. These can increase your chances of quitting for good. Care for your mental health. It is easy to get weighed down by worry and stress. Learn strategies to manage stress, like deep breathing and mindfulness, and stay connected with your family and community. If you find you often feel sad or hopeless, talk with your doctor. Treatment can help.   Talk to your doctor about whether you have any risk factors for sexually transmitted infections (STIs). You can help prevent STIs if you wait to have sex with a new partner (or partners) until you've each been tested for STIs. It also helps if you use condoms (male or female condoms) and if you limit your sex partners to one person who only has sex with you. Vaccines are available for some STIs. If you think you may have a problem with alcohol or drug use, talk to your doctor. This includes prescription medicines (such as amphetamines and opioids) and illegal drugs (such as cocaine and methamphetamine). Your doctor can help you figure out what type of treatment is best for you. Protect your skin from too much sun. When you're outdoors from 10 a.m. to 4 p.m., stay in the shade or cover up with clothing and a hat with a wide brim. Wear sunglasses that block UV rays. Even when it's cloudy, put broad-spectrum sunscreen (SPF 30 or higher) on any exposed skin. See a dentist one or two times a year for checkups and to have your teeth cleaned. Wear a seat belt in the car. When should you call for help? Watch closely for changes in your health, and be sure to contact your doctor if you have any problems or symptoms that concern you. Where can you learn more? Go to [email or web address] and sign in to your [employer] account. Enter Y560 in the [facility] box to learn more about \"Well Visit, Over 65: Care Instructions. \"     If you do not have an account, please click on the \"Sign Up Now\" link. Current as of: October 6, 2021               Content Version: 13.1  © 6553-8668 Healthwise, Incorporated. Care instructions adapted under license by [facility] ([facility]). If you have questions about a medical condition or this instruction, always ask your healthcare professional. [address] any warranty or liability for your use of this information.

## 2022-03-18 DIAGNOSIS — R25.2 MUSCLE CRAMPS: ICD-10-CM

## 2022-03-18 DIAGNOSIS — M54.31 SCIATICA OF RIGHT SIDE: ICD-10-CM

## 2022-03-21 RX ORDER — CYCLOBENZAPRINE HCL 10 MG
TABLET ORAL
Qty: 60 TABLET | Refills: 0 | Status: SHIPPED | OUTPATIENT
Start: 2022-03-21 | End: 2022-04-18

## 2022-03-28 DIAGNOSIS — F41.9 ANXIETY: ICD-10-CM

## 2022-03-28 DIAGNOSIS — R42 VERTIGO: ICD-10-CM

## 2022-03-28 RX ORDER — ALPRAZOLAM 0.5 MG/1
TABLET ORAL
Qty: 90 TABLET | Refills: 0 | Status: SHIPPED | OUTPATIENT
Start: 2022-03-28 | End: 2022-04-12

## 2022-04-12 DIAGNOSIS — R42 VERTIGO: ICD-10-CM

## 2022-04-12 DIAGNOSIS — H93.19 TINNITUS, UNSPECIFIED LATERALITY: ICD-10-CM

## 2022-04-12 DIAGNOSIS — F41.9 ANXIETY: ICD-10-CM

## 2022-04-12 RX ORDER — MECLIZINE HYDROCHLORIDE 25 MG/1
TABLET ORAL
Qty: 90 TABLET | Refills: 5 | Status: SHIPPED | OUTPATIENT
Start: 2022-04-12 | End: 2022-10-14

## 2022-04-12 RX ORDER — ALPRAZOLAM 0.5 MG/1
TABLET ORAL
Qty: 90 TABLET | Refills: 0 | Status: SHIPPED | OUTPATIENT
Start: 2022-04-12 | End: 2022-05-12

## 2022-04-17 DIAGNOSIS — M54.31 SCIATICA OF RIGHT SIDE: ICD-10-CM

## 2022-04-17 DIAGNOSIS — R25.2 MUSCLE CRAMPS: ICD-10-CM

## 2022-04-18 RX ORDER — CYCLOBENZAPRINE HCL 10 MG
TABLET ORAL
Qty: 60 TABLET | Refills: 0 | Status: SHIPPED | OUTPATIENT
Start: 2022-04-18 | End: 2022-05-17

## 2022-04-18 NOTE — TELEPHONE ENCOUNTER
pharmacy requesting medication refill. Please approve or deny this request.    Rx requested:  Requested Prescriptions     Pending Prescriptions Disp Refills    cyclobenzaprine (FLEXERIL) 10 MG tablet [Pharmacy Med Name: CYCLOBENZAPRINE 10MG TABLETS] 60 tablet 0     Sig: TAKE 1 TABLET BY MOUTH TWICE DAILY AS NEEDED FOR MUSCLE SPASMS         Last Office Visit:   10/7/2021      Next Visit Date:  No future appointments.

## 2022-05-17 DIAGNOSIS — R25.2 MUSCLE CRAMPS: ICD-10-CM

## 2022-05-17 DIAGNOSIS — M54.31 SCIATICA OF RIGHT SIDE: ICD-10-CM

## 2022-05-17 RX ORDER — CYCLOBENZAPRINE HCL 10 MG
TABLET ORAL
Qty: 60 TABLET | Refills: 0 | Status: SHIPPED | OUTPATIENT
Start: 2022-05-17 | End: 2022-06-16

## 2022-06-16 DIAGNOSIS — M54.31 SCIATICA OF RIGHT SIDE: ICD-10-CM

## 2022-06-16 DIAGNOSIS — R25.2 MUSCLE CRAMPS: ICD-10-CM

## 2022-06-16 RX ORDER — CYCLOBENZAPRINE HCL 10 MG
TABLET ORAL
Qty: 60 TABLET | Refills: 0 | Status: SHIPPED | OUTPATIENT
Start: 2022-06-16 | End: 2022-06-17

## 2022-06-17 DIAGNOSIS — R25.2 MUSCLE CRAMPS: ICD-10-CM

## 2022-06-17 DIAGNOSIS — M54.31 SCIATICA OF RIGHT SIDE: ICD-10-CM

## 2022-06-17 RX ORDER — CYCLOBENZAPRINE HCL 10 MG
TABLET ORAL
Qty: 60 TABLET | Refills: 0 | Status: SHIPPED | OUTPATIENT
Start: 2022-06-17 | End: 2022-08-15

## 2022-07-25 DIAGNOSIS — F41.9 ANXIETY: ICD-10-CM

## 2022-07-25 DIAGNOSIS — R42 VERTIGO: ICD-10-CM

## 2022-07-25 RX ORDER — ALPRAZOLAM 0.5 MG/1
TABLET ORAL
Qty: 90 TABLET | OUTPATIENT
Start: 2022-07-25 | End: 2022-08-24

## 2022-08-09 ENCOUNTER — OFFICE VISIT (OUTPATIENT)
Dept: PRIMARY CARE CLINIC | Age: 77
End: 2022-08-09
Payer: MEDICARE

## 2022-08-09 VITALS
TEMPERATURE: 97.1 F | OXYGEN SATURATION: 98 % | WEIGHT: 179 LBS | DIASTOLIC BLOOD PRESSURE: 76 MMHG | HEART RATE: 93 BPM | HEIGHT: 63 IN | SYSTOLIC BLOOD PRESSURE: 124 MMHG | BODY MASS INDEX: 31.71 KG/M2

## 2022-08-09 DIAGNOSIS — E78.49 OTHER HYPERLIPIDEMIA: ICD-10-CM

## 2022-08-09 DIAGNOSIS — F41.9 ANXIETY: Primary | ICD-10-CM

## 2022-08-09 DIAGNOSIS — Z00.00 PREVENTATIVE HEALTH CARE: ICD-10-CM

## 2022-08-09 DIAGNOSIS — Z79.899 MEDICATION MANAGEMENT: ICD-10-CM

## 2022-08-09 DIAGNOSIS — R68.2 DRY MOUTH: ICD-10-CM

## 2022-08-09 DIAGNOSIS — R73.9 HYPERGLYCEMIA: ICD-10-CM

## 2022-08-09 DIAGNOSIS — D64.9 ANEMIA, UNSPECIFIED TYPE: ICD-10-CM

## 2022-08-09 DIAGNOSIS — E03.9 HYPOTHYROIDISM, UNSPECIFIED TYPE: ICD-10-CM

## 2022-08-09 LAB
REASON FOR REJECTION: NORMAL
REJECTED TEST: 7479

## 2022-08-09 PROCEDURE — G8399 PT W/DXA RESULTS DOCUMENT: HCPCS | Performed by: INTERNAL MEDICINE

## 2022-08-09 PROCEDURE — 99213 OFFICE O/P EST LOW 20 MIN: CPT | Performed by: INTERNAL MEDICINE

## 2022-08-09 PROCEDURE — G8417 CALC BMI ABV UP PARAM F/U: HCPCS | Performed by: INTERNAL MEDICINE

## 2022-08-09 PROCEDURE — 1123F ACP DISCUSS/DSCN MKR DOCD: CPT | Performed by: INTERNAL MEDICINE

## 2022-08-09 PROCEDURE — 1036F TOBACCO NON-USER: CPT | Performed by: INTERNAL MEDICINE

## 2022-08-09 PROCEDURE — 1090F PRES/ABSN URINE INCON ASSESS: CPT | Performed by: INTERNAL MEDICINE

## 2022-08-09 PROCEDURE — G8427 DOCREV CUR MEDS BY ELIG CLIN: HCPCS | Performed by: INTERNAL MEDICINE

## 2022-08-09 RX ORDER — ALPRAZOLAM 0.5 MG/1
0.5 TABLET ORAL 3 TIMES DAILY PRN
Qty: 90 TABLET | Refills: 2 | Status: SHIPPED | OUTPATIENT
Start: 2022-08-09 | End: 2022-11-07

## 2022-08-09 RX ORDER — ALPRAZOLAM 0.5 MG/1
TABLET ORAL
COMMUNITY
Start: 2022-05-21 | End: 2022-08-09 | Stop reason: SDUPTHER

## 2022-08-09 SDOH — ECONOMIC STABILITY: FOOD INSECURITY: WITHIN THE PAST 12 MONTHS, THE FOOD YOU BOUGHT JUST DIDN'T LAST AND YOU DIDN'T HAVE MONEY TO GET MORE.: NEVER TRUE

## 2022-08-09 SDOH — ECONOMIC STABILITY: FOOD INSECURITY: WITHIN THE PAST 12 MONTHS, YOU WORRIED THAT YOUR FOOD WOULD RUN OUT BEFORE YOU GOT MONEY TO BUY MORE.: NEVER TRUE

## 2022-08-09 ASSESSMENT — PATIENT HEALTH QUESTIONNAIRE - PHQ9
SUM OF ALL RESPONSES TO PHQ9 QUESTIONS 1 & 2: 0
SUM OF ALL RESPONSES TO PHQ QUESTIONS 1-9: 0
1. LITTLE INTEREST OR PLEASURE IN DOING THINGS: 0
SUM OF ALL RESPONSES TO PHQ QUESTIONS 1-9: 0
2. FEELING DOWN, DEPRESSED OR HOPELESS: 0
SUM OF ALL RESPONSES TO PHQ QUESTIONS 1-9: 0
SUM OF ALL RESPONSES TO PHQ QUESTIONS 1-9: 0

## 2022-08-09 ASSESSMENT — ENCOUNTER SYMPTOMS
FACIAL SWELLING: 0
PHOTOPHOBIA: 0
BLOOD IN STOOL: 0
ABDOMINAL DISTENTION: 0
CHOKING: 0
APNEA: 0

## 2022-08-09 ASSESSMENT — SOCIAL DETERMINANTS OF HEALTH (SDOH): HOW HARD IS IT FOR YOU TO PAY FOR THE VERY BASICS LIKE FOOD, HOUSING, MEDICAL CARE, AND HEATING?: NOT HARD AT ALL

## 2022-08-09 NOTE — PROGRESS NOTES
Calixto Mc 76 y.o. female presents today with   Chief Complaint   Patient presents with    Follow-up    Anxiety    Medication Refill       Mental Health Problem  The primary symptoms include somatic symptoms. The primary symptoms do not include hallucinations. This is a recurrent problem. The onset of the illness is precipitated by emotional stress and a stressful event. The degree of incapacity that she is experiencing as a consequence of her illness is mild. Additional symptoms of the illness include insomnia. She does not admit to suicidal ideas. she was taking care of . Past Medical History:   Diagnosis Date    Anxiety     Irritable bowel syndrome with diarrhea     Migraine     Sciatica of right side 4/27/2017    Vertigo      Patient Active Problem List    Diagnosis Date Noted    Migraine without aura and with status migrainosus, not intractable 05/22/2020    Dizziness and giddiness 05/22/2020    Benign paroxysmal vertigo of both ears 05/22/2020    Closed fracture of right distal fibula 09/05/2018    Acute right-sided low back pain with right-sided sciatica 04/27/2017    Sciatica of right side 04/27/2017     Past Surgical History:   Procedure Laterality Date    HYSTERECTOMY (CERVIX STATUS UNKNOWN)       No family history on file.   Social History     Socioeconomic History    Marital status:      Spouse name: None    Number of children: None    Years of education: None    Highest education level: None   Tobacco Use    Smoking status: Never    Smokeless tobacco: Never   Substance and Sexual Activity    Alcohol use: Yes     Comment: 1 daily    Drug use: No     Social Determinants of Health     Financial Resource Strain: Low Risk     Difficulty of Paying Living Expenses: Not hard at all   Food Insecurity: No Food Insecurity    Worried About [address] [employer] in the Last Year: Never true    Ran Out of Food in the Last Year: Never true   Physical Activity: Inactive    Days of Exercise per Week: 0 days    Minutes of Exercise per Session: 0 min     Allergies   Allergen Reactions    Bactrim [Sulfamethoxazole-Trimethoprim] Shortness Of Breath    Albumen, Egg Diarrhea    Macrobid [Nitrofurantoin Macrocrystal]      SOB       Review of Systems   Constitutional:  Negative for chills and fever. HENT:  Negative for facial swelling and nosebleeds. Eyes:  Negative for photophobia and visual disturbance. Respiratory:  Negative for apnea and choking. Cardiovascular:  Negative for chest pain and palpitations. Gastrointestinal:  Negative for abdominal distention and blood in stool. Genitourinary:  Negative for enuresis and hematuria. Musculoskeletal:  Positive for arthralgias. Negative for gait problem and joint swelling. Skin:  Negative for rash. Neurological:  Negative for syncope and speech difficulty. Hematological:  Does not bruise/bleed easily. Psychiatric/Behavioral:  Positive for sleep disturbance. Negative for hallucinations and suicidal ideas. The patient has insomnia. Vitals:    08/09/22 1335   BP: 124/76   Site: Right Upper Arm   Position: Sitting   Cuff Size: Large Adult   Pulse: 93   Temp: 97.1 °F (36.2 °C)   SpO2: 98%   Weight: 179 lb (81.2 kg)   Height: 5' 3\" (1.6 m)       Physical Exam  Constitutional:       Appearance: She is well-developed. HENT:      Head: Normocephalic and atraumatic. Eyes:      Pupils: Pupils are equal, round, and reactive to light. Cardiovascular:      Rate and Rhythm: Normal rate and regular rhythm. Heart sounds: Normal heart sounds. Pulmonary:      Effort: No respiratory distress. Breath sounds: Normal breath sounds. No wheezing. Abdominal:      General: There is no distension. Musculoskeletal:         General: Normal range of motion. Cervical back: Normal range of motion. Skin:     Coloration: Skin is not jaundiced. Neurological:      Mental Status: She is alert and oriented to person, place, and time. Cranial Nerves: No cranial nerve deficit. Psychiatric:         Mood and Affect: Mood normal.   Assessment/Plan  Deejay Sim was seen today for follow-up, anxiety and medication refill. Diagnoses and all orders for this visit:    Anxiety  -     CBC with Auto Differential; Future  -     ALPRAZolam (XANAX) 0.5 MG tablet; Take 1 tablet by mouth 3 times daily as needed for Sleep or Anxiety for up to 90 days. Anemia, unspecified type  -     CBC with Auto Differential; Future    Medication management  -     Pain Management Drug Screen; Future    Hyperglycemia  -     Hemoglobin A1C; Future    Other hyperlipidemia  -     Lipid, Fasting; Future    Hypothyroidism, unspecified type  -     TSH with Reflex; Future    Preventative health care  -     Hemoglobin A1C; Future  -     CBC with Auto Differential; Future  -     Lipid, Fasting; Future    Dry mouth  -     DILCIA Screen With Reflex; Future      Return in about 1 year (around 8/9/2023), or if symptoms worsen or fail to improve.     Vaishali Pugh MD

## 2022-08-12 ENCOUNTER — TELEPHONE (OUTPATIENT)
Dept: PRIMARY CARE CLINIC | Age: 77
End: 2022-08-12

## 2022-08-15 DIAGNOSIS — R25.2 MUSCLE CRAMPS: ICD-10-CM

## 2022-08-15 DIAGNOSIS — M54.31 SCIATICA OF RIGHT SIDE: ICD-10-CM

## 2022-08-15 DIAGNOSIS — N95.1 MENOPAUSAL SYMPTOM: ICD-10-CM

## 2022-08-15 RX ORDER — CYCLOBENZAPRINE HCL 10 MG
TABLET ORAL
Qty: 60 TABLET | Refills: 0 | Status: SHIPPED | OUTPATIENT
Start: 2022-08-15 | End: 2022-09-14

## 2022-09-09 DIAGNOSIS — F41.9 ANXIETY: ICD-10-CM

## 2022-09-09 DIAGNOSIS — R73.9 HYPERGLYCEMIA: ICD-10-CM

## 2022-09-09 DIAGNOSIS — Z79.899 MEDICATION MANAGEMENT: ICD-10-CM

## 2022-09-09 DIAGNOSIS — E03.9 HYPOTHYROIDISM, UNSPECIFIED TYPE: ICD-10-CM

## 2022-09-09 DIAGNOSIS — R68.2 DRY MOUTH: ICD-10-CM

## 2022-09-09 DIAGNOSIS — Z00.00 PREVENTATIVE HEALTH CARE: ICD-10-CM

## 2022-09-09 DIAGNOSIS — E78.49 OTHER HYPERLIPIDEMIA: ICD-10-CM

## 2022-09-09 DIAGNOSIS — D64.9 ANEMIA, UNSPECIFIED TYPE: ICD-10-CM

## 2022-09-09 LAB
BASOPHILS ABSOLUTE: 0 K/UL (ref 0–0.2)
BASOPHILS RELATIVE PERCENT: 0.3 %
CHOLESTEROL, FASTING: 213 MG/DL (ref 0–199)
EOSINOPHILS ABSOLUTE: 0.3 K/UL (ref 0–0.7)
EOSINOPHILS RELATIVE PERCENT: 4.3 %
HBA1C MFR BLD: 5.6 % (ref 4.8–5.9)
HCT VFR BLD CALC: 40.1 % (ref 37–47)
HDLC SERPL-MCNC: 59 MG/DL (ref 40–59)
HEMOGLOBIN: 12.8 G/DL (ref 12–16)
LDL CHOLESTEROL CALCULATED: 114 MG/DL (ref 0–129)
LYMPHOCYTES ABSOLUTE: 1.5 K/UL (ref 1–4.8)
LYMPHOCYTES RELATIVE PERCENT: 21.9 %
MCH RBC QN AUTO: 27 PG (ref 27–31.3)
MCHC RBC AUTO-ENTMCNC: 32 % (ref 33–37)
MCV RBC AUTO: 84.5 FL (ref 82–100)
MONOCYTES ABSOLUTE: 0.4 K/UL (ref 0.2–0.8)
MONOCYTES RELATIVE PERCENT: 6.4 %
NEUTROPHILS ABSOLUTE: 4.4 K/UL (ref 1.4–6.5)
NEUTROPHILS RELATIVE PERCENT: 67.1 %
PDW BLD-RTO: 13.4 % (ref 11.5–14.5)
PLATELET # BLD: 214 K/UL (ref 130–400)
RBC # BLD: 4.74 M/UL (ref 4.2–5.4)
TRIGLYCERIDE, FASTING: 199 MG/DL (ref 0–150)
TSH REFLEX: 3.42 UIU/ML (ref 0.44–3.86)
WBC # BLD: 6.6 K/UL (ref 4.8–10.8)

## 2022-09-12 LAB
6-ACETYLMORPHINE: NOT DETECTED
7-AMINOCLONAZEPAM: NOT DETECTED
ALPHA-OH-ALPRAZOLAM: PRESENT
ALPHA-OH-MIDAZOLAM, URINE: NOT DETECTED
ALPRAZOLAM: PRESENT
AMPHETAMINE: NOT DETECTED
BARBITURATES: NOT DETECTED
BENZOYLECGONINE: NOT DETECTED
BUPRENORPHINE: NOT DETECTED
CARISOPRODOL: NOT DETECTED
CLONAZEPAM: NOT DETECTED
CODEINE: NOT DETECTED
CREATININE URINE: 86.9 MG/DL (ref 20–400)
DIAZEPAM: NOT DETECTED
EER PAIN MGT DRUG PANEL, HIGH RES/EMIT U: NORMAL
ETHYL GLUCURONIDE: PRESENT
FENTANYL: NOT DETECTED
GABAPENTIN: NOT DETECTED
HYDROCODONE: NOT DETECTED
HYDROMORPHONE: NOT DETECTED
LORAZEPAM: NOT DETECTED
MARIJUANA METABOLITE: NOT DETECTED
MDA: NOT DETECTED
MDEA: NOT DETECTED
MDMA URINE: NOT DETECTED
MEPERIDINE: NOT DETECTED
METHADONE: NOT DETECTED
METHAMPHETAMINE: NOT DETECTED
METHYLPHENIDATE: NOT DETECTED
MIDAZOLAM: NOT DETECTED
MORPHINE: NOT DETECTED
NALOXONE: NOT DETECTED
NORBUPRENORPHINE, FREE: NOT DETECTED
NORDIAZEPAM: NOT DETECTED
NORFENTANYL: NOT DETECTED
NORHYDROCODONE, URINE: NOT DETECTED
NOROXYCODONE: NOT DETECTED
NOROXYMORPHONE, URINE: NOT DETECTED
OXAZEPAM: NOT DETECTED
OXYCODONE: NOT DETECTED
OXYMORPHONE: NOT DETECTED
PAIN MANAGEMENT DRUG PANEL: NORMAL
PCP: NOT DETECTED
PHENTERMINE: NOT DETECTED
PREGABALIN: NOT DETECTED
TAPENTADOL, URINE: NOT DETECTED
TAPENTADOL-O-SULFATE, URINE: NOT DETECTED
TEMAZEPAM: NOT DETECTED
TRAMADOL: NOT DETECTED
ZOLPIDEM: NOT DETECTED

## 2022-09-13 LAB — ANA IGG, ELISA: NORMAL

## 2022-09-14 DIAGNOSIS — M54.31 SCIATICA OF RIGHT SIDE: ICD-10-CM

## 2022-09-14 DIAGNOSIS — R25.2 MUSCLE CRAMPS: ICD-10-CM

## 2022-09-14 RX ORDER — CYCLOBENZAPRINE HCL 10 MG
TABLET ORAL
Qty: 60 TABLET | Refills: 0 | Status: SHIPPED | OUTPATIENT
Start: 2022-09-14 | End: 2022-10-14

## 2022-10-14 DIAGNOSIS — M54.31 SCIATICA OF RIGHT SIDE: ICD-10-CM

## 2022-10-14 DIAGNOSIS — R25.2 MUSCLE CRAMPS: ICD-10-CM

## 2022-10-14 DIAGNOSIS — H93.19 TINNITUS, UNSPECIFIED LATERALITY: ICD-10-CM

## 2022-10-14 DIAGNOSIS — R42 VERTIGO: ICD-10-CM

## 2022-10-14 RX ORDER — MECLIZINE HYDROCHLORIDE 25 MG/1
TABLET ORAL
Qty: 90 TABLET | Refills: 5 | Status: SHIPPED | OUTPATIENT
Start: 2022-10-14

## 2022-10-14 RX ORDER — CYCLOBENZAPRINE HCL 10 MG
TABLET ORAL
Qty: 60 TABLET | Refills: 0 | Status: SHIPPED | OUTPATIENT
Start: 2022-10-14

## 2022-10-21 ENCOUNTER — TELEPHONE (OUTPATIENT)
Dept: PRIMARY CARE CLINIC | Age: 77
End: 2022-10-21

## 2022-11-13 DIAGNOSIS — M54.31 SCIATICA OF RIGHT SIDE: ICD-10-CM

## 2022-11-13 DIAGNOSIS — R25.2 MUSCLE CRAMPS: ICD-10-CM

## 2022-11-14 RX ORDER — CYCLOBENZAPRINE HCL 10 MG
TABLET ORAL
Qty: 60 TABLET | Refills: 0 | Status: SHIPPED | OUTPATIENT
Start: 2022-11-14

## 2022-12-13 DIAGNOSIS — M54.31 SCIATICA OF RIGHT SIDE: ICD-10-CM

## 2022-12-13 DIAGNOSIS — R25.2 MUSCLE CRAMPS: ICD-10-CM

## 2022-12-15 RX ORDER — CYCLOBENZAPRINE HCL 10 MG
TABLET ORAL
Qty: 60 TABLET | Refills: 0 | Status: SHIPPED | OUTPATIENT
Start: 2022-12-15

## 2023-01-12 DIAGNOSIS — M54.31 SCIATICA OF RIGHT SIDE: ICD-10-CM

## 2023-01-12 DIAGNOSIS — R25.2 MUSCLE CRAMPS: ICD-10-CM

## 2023-01-12 RX ORDER — CYCLOBENZAPRINE HCL 10 MG
TABLET ORAL
Qty: 60 TABLET | Refills: 0 | Status: SHIPPED | OUTPATIENT
Start: 2023-01-12

## 2023-02-11 DIAGNOSIS — R25.2 MUSCLE CRAMPS: ICD-10-CM

## 2023-02-11 DIAGNOSIS — M54.31 SCIATICA OF RIGHT SIDE: ICD-10-CM

## 2023-02-16 RX ORDER — CYCLOBENZAPRINE HCL 10 MG
TABLET ORAL
Qty: 60 TABLET | Refills: 0 | Status: SHIPPED | OUTPATIENT
Start: 2023-02-16

## 2023-02-16 NOTE — TELEPHONE ENCOUNTER
Comments: This request is coming from the pharmacy. Last Office Visit (last PCP visit):   8/9/2022    Next Visit Date:  No future appointments. If hasn't been seen in over a year OR hasn't followed up according to last diabetes/ADHD visit, make appointment for patient before sending refill to provider.     Rx requested:  Requested Prescriptions     Pending Prescriptions Disp Refills    cyclobenzaprine (FLEXERIL) 10 MG tablet [Pharmacy Med Name: CYCLOBENZAPRINE 10MG TABLETS] 60 tablet 0     Sig: TAKE 1 TABLET BY MOUTH TWICE DAILY AS NEEDED FOR MUSCLE SPASMS

## 2023-02-24 ENCOUNTER — TELEPHONE (OUTPATIENT)
Dept: PRIMARY CARE CLINIC | Age: 78
End: 2023-02-24

## 2023-02-24 DIAGNOSIS — R42 VERTIGO: Primary | ICD-10-CM

## 2023-02-24 RX ORDER — PROMETHAZINE HYDROCHLORIDE 12.5 MG/1
12.5 TABLET ORAL 2 TIMES DAILY PRN
Qty: 30 TABLET | Refills: 0 | Status: SHIPPED | OUTPATIENT
Start: 2023-02-24 | End: 2023-03-11

## 2023-02-28 ENCOUNTER — TELEPHONE (OUTPATIENT)
Dept: PRIMARY CARE CLINIC | Age: 78
End: 2023-02-28

## 2023-02-28 NOTE — TELEPHONE ENCOUNTER
She said she does not want to take the promethazine due to what she read on it. She is looking for another medication for dizziness, she said it is coming from her right ear?

## 2023-03-13 DIAGNOSIS — R25.2 MUSCLE CRAMPS: ICD-10-CM

## 2023-03-13 DIAGNOSIS — M54.31 SCIATICA OF RIGHT SIDE: ICD-10-CM

## 2023-03-13 DIAGNOSIS — N95.1 MENOPAUSAL SYMPTOM: ICD-10-CM

## 2023-03-13 RX ORDER — CYCLOBENZAPRINE HCL 10 MG
TABLET ORAL
Qty: 60 TABLET | Refills: 0 | Status: SHIPPED | OUTPATIENT
Start: 2023-03-13 | End: 2023-04-12

## 2023-03-22 ENCOUNTER — OFFICE VISIT (OUTPATIENT)
Dept: FAMILY MEDICINE CLINIC | Age: 78
End: 2023-03-22
Payer: MEDICARE

## 2023-03-22 DIAGNOSIS — Z00.00 MEDICARE ANNUAL WELLNESS VISIT, SUBSEQUENT: Primary | ICD-10-CM

## 2023-03-22 PROCEDURE — G0439 PPPS, SUBSEQ VISIT: HCPCS | Performed by: NURSE PRACTITIONER

## 2023-03-22 PROCEDURE — G8484 FLU IMMUNIZE NO ADMIN: HCPCS | Performed by: NURSE PRACTITIONER

## 2023-03-22 PROCEDURE — 1123F ACP DISCUSS/DSCN MKR DOCD: CPT | Performed by: NURSE PRACTITIONER

## 2023-03-22 SDOH — ECONOMIC STABILITY: HOUSING INSECURITY
IN THE LAST 12 MONTHS, WAS THERE A TIME WHEN YOU DID NOT HAVE A STEADY PLACE TO SLEEP OR SLEPT IN A SHELTER (INCLUDING NOW)?: NO

## 2023-03-22 SDOH — ECONOMIC STABILITY: HOUSING INSECURITY: IN THE LAST 12 MONTHS, HOW MANY PLACES HAVE YOU LIVED?: 1

## 2023-03-22 SDOH — ECONOMIC STABILITY: INCOME INSECURITY: IN THE LAST 12 MONTHS, WAS THERE A TIME WHEN YOU WERE NOT ABLE TO PAY THE MORTGAGE OR RENT ON TIME?: NO

## 2023-03-22 SDOH — ECONOMIC STABILITY: TRANSPORTATION INSECURITY
IN THE PAST 12 MONTHS, HAS LACK OF TRANSPORTATION KEPT YOU FROM MEETINGS, WORK, OR FROM GETTING THINGS NEEDED FOR DAILY LIVING?: NO

## 2023-03-22 ASSESSMENT — PATIENT HEALTH QUESTIONNAIRE - PHQ9
2. FEELING DOWN, DEPRESSED OR HOPELESS: 0
SUM OF ALL RESPONSES TO PHQ QUESTIONS 1-9: 1
SUM OF ALL RESPONSES TO PHQ9 QUESTIONS 1 & 2: 1
1. LITTLE INTEREST OR PLEASURE IN DOING THINGS: 1

## 2023-03-22 NOTE — PROGRESS NOTES
Reported on 8/9/2022  Jodie Hutchison MD   Azelastine-Fluticasone [facility]) 137-50 MCG/ACT SUSP 2 sprays by Nasal route 2 times daily as needed  Jodie Hutchison MD   glycopyrrolate (ROBINUL) 2 MG tablet Take 1 tablet by mouth 2 times daily  Jodie Hutchison MD   Cetirizine HCl 10 MG CAPS Take  by mouth daily. Historical Provider, MD   Naproxen Sodium 220 MG CAPS Take  by mouth as needed.   Historical Provider, MD [place] (Including outside providers/suppliers regularly involved in providing care):   Patient Care Team:  Jodie Hutchison MD as PCP - General (Internal Medicine)  Jodie Hutchison MD as PCP - Empaneled Provider  Ivan Maldonado MD     Reviewed and updated this visit:  Tobacco  Allergies  Meds  Med Hx  Surg Hx  Soc Hx  Fam Hx             URMILA Jackson, APRN - CNP

## 2023-03-27 ENCOUNTER — OFFICE VISIT (OUTPATIENT)
Dept: PRIMARY CARE CLINIC | Age: 78
End: 2023-03-27
Payer: COMMERCIAL

## 2023-03-27 VITALS
HEIGHT: 63 IN | HEART RATE: 85 BPM | SYSTOLIC BLOOD PRESSURE: 138 MMHG | BODY MASS INDEX: 32.07 KG/M2 | DIASTOLIC BLOOD PRESSURE: 88 MMHG | WEIGHT: 181 LBS | OXYGEN SATURATION: 96 %

## 2023-03-27 DIAGNOSIS — R25.2 MUSCLE CRAMPS: ICD-10-CM

## 2023-03-27 DIAGNOSIS — K58.0 IRRITABLE BOWEL SYNDROME WITH DIARRHEA: ICD-10-CM

## 2023-03-27 DIAGNOSIS — N95.1 MENOPAUSAL SYMPTOM: ICD-10-CM

## 2023-03-27 DIAGNOSIS — H93.19 TINNITUS, UNSPECIFIED LATERALITY: ICD-10-CM

## 2023-03-27 DIAGNOSIS — R42 VERTIGO: ICD-10-CM

## 2023-03-27 DIAGNOSIS — F41.9 ANXIETY: ICD-10-CM

## 2023-03-27 DIAGNOSIS — G43.001 MIGRAINE WITHOUT AURA AND WITH STATUS MIGRAINOSUS, NOT INTRACTABLE: ICD-10-CM

## 2023-03-27 DIAGNOSIS — M54.31 SCIATICA OF RIGHT SIDE: ICD-10-CM

## 2023-03-27 PROCEDURE — 1036F TOBACCO NON-USER: CPT | Performed by: INTERNAL MEDICINE

## 2023-03-27 PROCEDURE — G8427 DOCREV CUR MEDS BY ELIG CLIN: HCPCS | Performed by: INTERNAL MEDICINE

## 2023-03-27 PROCEDURE — 99214 OFFICE O/P EST MOD 30 MIN: CPT | Performed by: INTERNAL MEDICINE

## 2023-03-27 PROCEDURE — G8484 FLU IMMUNIZE NO ADMIN: HCPCS | Performed by: INTERNAL MEDICINE

## 2023-03-27 PROCEDURE — 1123F ACP DISCUSS/DSCN MKR DOCD: CPT | Performed by: INTERNAL MEDICINE

## 2023-03-27 PROCEDURE — G8417 CALC BMI ABV UP PARAM F/U: HCPCS | Performed by: INTERNAL MEDICINE

## 2023-03-27 PROCEDURE — G8399 PT W/DXA RESULTS DOCUMENT: HCPCS | Performed by: INTERNAL MEDICINE

## 2023-03-27 PROCEDURE — 1090F PRES/ABSN URINE INCON ASSESS: CPT | Performed by: INTERNAL MEDICINE

## 2023-03-27 RX ORDER — ALPRAZOLAM 0.5 MG/1
0.5 TABLET ORAL NIGHTLY PRN
COMMUNITY

## 2023-03-27 RX ORDER — DICYCLOMINE HYDROCHLORIDE 10 MG/1
10 CAPSULE ORAL
Qty: 120 CAPSULE | Refills: 5 | Status: SHIPPED | OUTPATIENT
Start: 2023-03-27

## 2023-03-27 RX ORDER — ALPRAZOLAM 0.5 MG/1
0.5 TABLET ORAL 3 TIMES DAILY PRN
Qty: 90 TABLET | Refills: 2 | Status: SHIPPED | OUTPATIENT
Start: 2023-03-27 | End: 2023-06-25

## 2023-03-27 RX ORDER — HYDROCHLOROTHIAZIDE 25 MG/1
25 TABLET ORAL EVERY MORNING
Qty: 30 TABLET | Refills: 5 | Status: SHIPPED | OUTPATIENT
Start: 2023-03-27

## 2023-03-27 RX ORDER — SUMATRIPTAN 100 MG/1
100 TABLET, FILM COATED ORAL DAILY PRN
Qty: 9 TABLET | Refills: 5 | Status: SHIPPED | OUTPATIENT
Start: 2023-03-27

## 2023-03-27 RX ORDER — CYCLOBENZAPRINE HCL 10 MG
10 TABLET ORAL DAILY PRN
Qty: 30 TABLET | Refills: 5 | Status: SHIPPED | OUTPATIENT
Start: 2023-03-27

## 2023-03-27 RX ORDER — MECLIZINE HYDROCHLORIDE 25 MG/1
25 TABLET ORAL 2 TIMES DAILY PRN
Qty: 60 TABLET | Refills: 5 | Status: SHIPPED | OUTPATIENT
Start: 2023-03-27

## 2023-03-27 ASSESSMENT — ENCOUNTER SYMPTOMS
PHOTOPHOBIA: 0
APNEA: 0
ABDOMINAL DISTENTION: 0
ABDOMINAL PAIN: 0
FACIAL SWELLING: 0
BACK PAIN: 1
CHOKING: 0
BLOOD IN STOOL: 0

## 2023-03-27 NOTE — PROGRESS NOTES
Star Morales 76 y.o. female presents today with   Chief Complaint   Patient presents with    Follow-up    Anxiety    Medication Refill       Anxiety  Presents for follow-up visit. Symptoms include dizziness, irritability and nervous/anxious behavior. Patient reports no chest pain, palpitations or suicidal ideas. Muscle Pain  This is a recurrent problem. The current episode started more than 1 year ago. The problem occurs daily. The problem has been waxing and waning since onset. Pain location: difuse. The pain is mild. Pertinent negatives include no abdominal pain, chest pain, fever, joint swelling or rash. Back Pain  This is a chronic problem. The current episode started more than 1 year ago. The problem occurs every several days. The problem has been waxing and waning since onset. The pain is present in the lumbar spine and gluteal. The quality of the pain is described as aching. The pain is at a severity of 4/10. The pain is moderate. The symptoms are aggravated by bending and twisting. Pertinent negatives include no abdominal pain, chest pain or fever. Dizziness  This is a recurrent problem. The current episode started more than 1 year ago. The problem occurs daily. Pertinent negatives include no abdominal pain, chest pain, chills, fever, joint swelling or rash. Migraine   This is a recurrent problem. The current episode started more than 1 year ago. The problem occurs monthly. The problem has been waxing and waning. The pain is located in the Left unilateral region. The pain quality is similar to prior headaches. The quality of the pain is described as throbbing and aching. The pain is at a severity of 7/10. Associated symptoms include back pain, dizziness and tinnitus. Pertinent negatives include no abdominal pain, fever or photophobia.      Past Medical History:   Diagnosis Date    Anxiety     Irritable bowel syndrome with diarrhea     Migraine     Sciatica of right side 4/27/2017    Vertigo

## 2023-05-12 DIAGNOSIS — M54.31 SCIATICA OF RIGHT SIDE: ICD-10-CM

## 2023-05-12 DIAGNOSIS — H93.19 TINNITUS, UNSPECIFIED LATERALITY: ICD-10-CM

## 2023-05-12 DIAGNOSIS — R42 VERTIGO: ICD-10-CM

## 2023-05-12 DIAGNOSIS — R25.2 MUSCLE CRAMPS: ICD-10-CM

## 2023-05-12 RX ORDER — MECLIZINE HYDROCHLORIDE 25 MG/1
TABLET ORAL
Qty: 90 TABLET | Refills: 5 | Status: SHIPPED | OUTPATIENT
Start: 2023-05-12

## 2023-05-12 RX ORDER — CYCLOBENZAPRINE HCL 10 MG
TABLET ORAL
Qty: 60 TABLET | Refills: 0 | Status: SHIPPED | OUTPATIENT
Start: 2023-05-12

## 2023-06-05 DIAGNOSIS — R21 RASH: ICD-10-CM

## 2023-06-06 RX ORDER — DOXEPIN HYDROCHLORIDE 25 MG/1
25 CAPSULE ORAL 3 TIMES DAILY PRN
Qty: 90 CAPSULE | Refills: 1 | Status: SHIPPED | OUTPATIENT
Start: 2023-06-06

## 2023-06-27 ENCOUNTER — OFFICE VISIT (OUTPATIENT)
Dept: PRIMARY CARE CLINIC | Age: 78
End: 2023-06-27
Payer: MEDICARE

## 2023-06-27 VITALS
HEIGHT: 63 IN | BODY MASS INDEX: 32.07 KG/M2 | HEART RATE: 84 BPM | DIASTOLIC BLOOD PRESSURE: 84 MMHG | WEIGHT: 181 LBS | OXYGEN SATURATION: 96 % | SYSTOLIC BLOOD PRESSURE: 138 MMHG

## 2023-06-27 DIAGNOSIS — G89.29 CHRONIC PAIN OF LEFT ANKLE: Primary | ICD-10-CM

## 2023-06-27 DIAGNOSIS — M25.572 CHRONIC PAIN OF LEFT ANKLE: Primary | ICD-10-CM

## 2023-06-27 DIAGNOSIS — F41.9 ANXIETY: ICD-10-CM

## 2023-06-27 PROCEDURE — G8417 CALC BMI ABV UP PARAM F/U: HCPCS | Performed by: INTERNAL MEDICINE

## 2023-06-27 PROCEDURE — G8427 DOCREV CUR MEDS BY ELIG CLIN: HCPCS | Performed by: INTERNAL MEDICINE

## 2023-06-27 PROCEDURE — 1036F TOBACCO NON-USER: CPT | Performed by: INTERNAL MEDICINE

## 2023-06-27 PROCEDURE — 1123F ACP DISCUSS/DSCN MKR DOCD: CPT | Performed by: INTERNAL MEDICINE

## 2023-06-27 PROCEDURE — 1090F PRES/ABSN URINE INCON ASSESS: CPT | Performed by: INTERNAL MEDICINE

## 2023-06-27 PROCEDURE — 99213 OFFICE O/P EST LOW 20 MIN: CPT | Performed by: INTERNAL MEDICINE

## 2023-06-27 PROCEDURE — G8399 PT W/DXA RESULTS DOCUMENT: HCPCS | Performed by: INTERNAL MEDICINE

## 2023-06-27 RX ORDER — MELOXICAM 7.5 MG/1
7.5 TABLET ORAL DAILY
Qty: 30 TABLET | Refills: 3 | Status: SHIPPED | OUTPATIENT
Start: 2023-06-27

## 2023-06-27 RX ORDER — ALPRAZOLAM 0.5 MG/1
0.5 TABLET ORAL 3 TIMES DAILY PRN
Qty: 90 TABLET | Refills: 2 | Status: SHIPPED | OUTPATIENT
Start: 2023-06-27 | End: 2023-09-25

## 2023-06-27 RX ORDER — ESOMEPRAZOLE MAGNESIUM 10 MG/1
GRANULE, FOR SUSPENSION, EXTENDED RELEASE ORAL
COMMUNITY

## 2023-06-29 ASSESSMENT — ENCOUNTER SYMPTOMS
FACIAL SWELLING: 0
BLOOD IN STOOL: 0
ABDOMINAL DISTENTION: 0
PHOTOPHOBIA: 0
APNEA: 0
CHOKING: 0

## 2023-08-10 DIAGNOSIS — R25.2 MUSCLE CRAMPS: ICD-10-CM

## 2023-08-10 DIAGNOSIS — M54.31 SCIATICA OF RIGHT SIDE: ICD-10-CM

## 2023-08-10 RX ORDER — CYCLOBENZAPRINE HCL 10 MG
TABLET ORAL
Qty: 60 TABLET | Refills: 0 | Status: SHIPPED | OUTPATIENT
Start: 2023-08-10

## 2023-09-09 DIAGNOSIS — R25.2 MUSCLE CRAMPS: ICD-10-CM

## 2023-09-09 DIAGNOSIS — M54.31 SCIATICA OF RIGHT SIDE: ICD-10-CM

## 2023-09-11 RX ORDER — CYCLOBENZAPRINE HCL 10 MG
TABLET ORAL
Qty: 60 TABLET | Refills: 0 | Status: SHIPPED | OUTPATIENT
Start: 2023-09-11

## 2023-09-28 ENCOUNTER — TELEPHONE (OUTPATIENT)
Dept: PRIMARY CARE CLINIC | Age: 78
End: 2023-09-28

## 2023-09-28 DIAGNOSIS — N95.1 MENOPAUSAL SYMPTOM: ICD-10-CM

## 2023-10-09 DIAGNOSIS — M54.31 SCIATICA OF RIGHT SIDE: ICD-10-CM

## 2023-10-09 DIAGNOSIS — R25.2 MUSCLE CRAMPS: ICD-10-CM

## 2023-10-09 RX ORDER — CYCLOBENZAPRINE HCL 10 MG
TABLET ORAL
Qty: 60 TABLET | Refills: 0 | Status: SHIPPED | OUTPATIENT
Start: 2023-10-09

## 2023-10-26 DIAGNOSIS — G43.001 MIGRAINE WITHOUT AURA AND WITH STATUS MIGRAINOSUS, NOT INTRACTABLE: ICD-10-CM

## 2023-10-26 RX ORDER — SUMATRIPTAN 100 MG/1
100 TABLET, FILM COATED ORAL DAILY PRN
Qty: 9 TABLET | Refills: 5 | Status: SHIPPED | OUTPATIENT
Start: 2023-10-26

## 2023-11-08 DIAGNOSIS — M54.31 SCIATICA OF RIGHT SIDE: ICD-10-CM

## 2023-11-08 DIAGNOSIS — R25.2 MUSCLE CRAMPS: ICD-10-CM

## 2023-11-08 DIAGNOSIS — H93.19 TINNITUS, UNSPECIFIED LATERALITY: ICD-10-CM

## 2023-11-08 DIAGNOSIS — R42 VERTIGO: ICD-10-CM

## 2023-11-08 RX ORDER — MECLIZINE HYDROCHLORIDE 25 MG/1
TABLET ORAL
Qty: 90 TABLET | Refills: 5 | Status: SHIPPED | OUTPATIENT
Start: 2023-11-08

## 2023-11-08 RX ORDER — CYCLOBENZAPRINE HCL 10 MG
TABLET ORAL
Qty: 60 TABLET | Refills: 0 | Status: SHIPPED | OUTPATIENT
Start: 2023-11-08

## 2023-11-29 DIAGNOSIS — R21 RASH: ICD-10-CM

## 2023-11-29 RX ORDER — DOXEPIN HYDROCHLORIDE 25 MG/1
25 CAPSULE ORAL 3 TIMES DAILY PRN
Qty: 90 CAPSULE | Refills: 1 | Status: SHIPPED | OUTPATIENT
Start: 2023-11-29

## 2024-01-07 DIAGNOSIS — R25.2 MUSCLE CRAMPS: ICD-10-CM

## 2024-01-07 DIAGNOSIS — M54.31 SCIATICA OF RIGHT SIDE: ICD-10-CM

## 2024-01-08 RX ORDER — CYCLOBENZAPRINE HCL 10 MG
TABLET ORAL
Qty: 60 TABLET | Refills: 0 | Status: SHIPPED | OUTPATIENT
Start: 2024-01-08

## 2024-01-22 ENCOUNTER — OFFICE VISIT (OUTPATIENT)
Dept: PRIMARY CARE CLINIC | Age: 79
End: 2024-01-22
Payer: MEDICARE

## 2024-01-22 VITALS
OXYGEN SATURATION: 97 % | DIASTOLIC BLOOD PRESSURE: 62 MMHG | WEIGHT: 177 LBS | BODY MASS INDEX: 31.36 KG/M2 | SYSTOLIC BLOOD PRESSURE: 140 MMHG | RESPIRATION RATE: 18 BRPM | HEART RATE: 90 BPM | TEMPERATURE: 98.3 F | HEIGHT: 63 IN

## 2024-01-22 DIAGNOSIS — J02.9 PHARYNGITIS, UNSPECIFIED ETIOLOGY: ICD-10-CM

## 2024-01-22 DIAGNOSIS — J98.8 RESPIRATORY TRACT INFECTION: Primary | ICD-10-CM

## 2024-01-22 LAB
INFLUENZA A ANTIBODY: NORMAL
INFLUENZA B ANTIBODY: NORMAL
RSV ANTIGEN: NORMAL

## 2024-01-22 PROCEDURE — 1123F ACP DISCUSS/DSCN MKR DOCD: CPT | Performed by: INTERNAL MEDICINE

## 2024-01-22 PROCEDURE — G8484 FLU IMMUNIZE NO ADMIN: HCPCS | Performed by: INTERNAL MEDICINE

## 2024-01-22 PROCEDURE — G8427 DOCREV CUR MEDS BY ELIG CLIN: HCPCS | Performed by: INTERNAL MEDICINE

## 2024-01-22 PROCEDURE — 86756 RESPIRATORY VIRUS ANTIBODY: CPT | Performed by: INTERNAL MEDICINE

## 2024-01-22 PROCEDURE — G8417 CALC BMI ABV UP PARAM F/U: HCPCS | Performed by: INTERNAL MEDICINE

## 2024-01-22 PROCEDURE — 1090F PRES/ABSN URINE INCON ASSESS: CPT | Performed by: INTERNAL MEDICINE

## 2024-01-22 PROCEDURE — G8399 PT W/DXA RESULTS DOCUMENT: HCPCS | Performed by: INTERNAL MEDICINE

## 2024-01-22 PROCEDURE — 99214 OFFICE O/P EST MOD 30 MIN: CPT | Performed by: INTERNAL MEDICINE

## 2024-01-22 PROCEDURE — 1036F TOBACCO NON-USER: CPT | Performed by: INTERNAL MEDICINE

## 2024-01-22 PROCEDURE — 87804 INFLUENZA ASSAY W/OPTIC: CPT | Performed by: INTERNAL MEDICINE

## 2024-01-22 RX ORDER — AZITHROMYCIN 250 MG/1
TABLET, FILM COATED ORAL
Qty: 1 PACKET | Refills: 0 | Status: SHIPPED | OUTPATIENT
Start: 2024-01-22

## 2024-01-22 RX ORDER — GUAIFENESIN AND DEXTROMETHORPHAN HYDROBROMIDE 100; 10 MG/5ML; MG/5ML
10 SOLUTION ORAL EVERY 4 HOURS PRN
Qty: 120 ML | Refills: 0 | Status: SHIPPED | OUTPATIENT
Start: 2024-01-22

## 2024-01-22 SDOH — ECONOMIC STABILITY: INCOME INSECURITY: HOW HARD IS IT FOR YOU TO PAY FOR THE VERY BASICS LIKE FOOD, HOUSING, MEDICAL CARE, AND HEATING?: NOT HARD AT ALL

## 2024-01-22 SDOH — ECONOMIC STABILITY: FOOD INSECURITY: WITHIN THE PAST 12 MONTHS, THE FOOD YOU BOUGHT JUST DIDN'T LAST AND YOU DIDN'T HAVE MONEY TO GET MORE.: NEVER TRUE

## 2024-01-22 SDOH — ECONOMIC STABILITY: FOOD INSECURITY: WITHIN THE PAST 12 MONTHS, YOU WORRIED THAT YOUR FOOD WOULD RUN OUT BEFORE YOU GOT MONEY TO BUY MORE.: NEVER TRUE

## 2024-01-22 ASSESSMENT — ENCOUNTER SYMPTOMS
SORE THROAT: 1
DIARRHEA: 0
WHEEZING: 0
ABDOMINAL PAIN: 0
NAUSEA: 0
SINUS PRESSURE: 1
VOMITING: 0
SINUS PAIN: 1
COUGH: 1
RHINORRHEA: 0
SHORTNESS OF BREATH: 0

## 2024-01-22 ASSESSMENT — PATIENT HEALTH QUESTIONNAIRE - PHQ9
1. LITTLE INTEREST OR PLEASURE IN DOING THINGS: 0
SUM OF ALL RESPONSES TO PHQ QUESTIONS 1-9: 0
SUM OF ALL RESPONSES TO PHQ QUESTIONS 1-9: 0
SUM OF ALL RESPONSES TO PHQ9 QUESTIONS 1 & 2: 0
2. FEELING DOWN, DEPRESSED OR HOPELESS: 0
SUM OF ALL RESPONSES TO PHQ QUESTIONS 1-9: 0
SUM OF ALL RESPONSES TO PHQ QUESTIONS 1-9: 0

## 2024-01-22 NOTE — PROGRESS NOTES
Subjective:      Patient ID: Janessa Decker is a 78 y.o. female    Sore throat x 3 weeks   HPI  Pt presents with 3 weeks of sore throat. Assoc sinus congestion and right ear pressure. Assoc fever and chills.  Assoc dry cough, no chest pain, no wheezing, no SOB. No generalized body pain. No NV.   Past Medical History:   Diagnosis Date    Anxiety     Irritable bowel syndrome with diarrhea     Migraine     Sciatica of right side 4/27/2017    Vertigo      Past Surgical History:   Procedure Laterality Date    HYSTERECTOMY (CERVIX STATUS UNKNOWN)       Social History     Socioeconomic History    Marital status:      Spouse name: Not on file    Number of children: Not on file    Years of education: Not on file    Highest education level: Not on file   Occupational History    Not on file   Tobacco Use    Smoking status: Never    Smokeless tobacco: Never   Substance and Sexual Activity    Alcohol use: Yes     Comment: 1 daily    Drug use: No    Sexual activity: Not on file   Other Topics Concern    Not on file   Social History Narrative    Not on file     Social Determinants of Health     Financial Resource Strain: Low Risk  (1/22/2024)    Overall Financial Resource Strain (CARDIA)     Difficulty of Paying Living Expenses: Not hard at all   Food Insecurity: No Food Insecurity (1/22/2024)    Hunger Vital Sign     Worried About Running Out of Food in the Last Year: Never true     Ran Out of Food in the Last Year: Never true   Transportation Needs: No Transportation Needs (1/22/2024)    PRAPARE - Transportation     Lack of Transportation (Medical): No     Lack of Transportation (Non-Medical): No   Physical Activity: Inactive (3/22/2023)    Exercise Vital Sign     Days of Exercise per Week: 0 days     Minutes of Exercise per Session: 0 min   Stress: Not on file   Social Connections: Not on file   Intimate Partner Violence: Not on file   Housing Stability: Low Risk  (1/22/2024)    Housing Stability Vital Sign     Unable

## 2024-01-29 DIAGNOSIS — R21 RASH: ICD-10-CM

## 2024-01-29 RX ORDER — DOXEPIN HYDROCHLORIDE 25 MG/1
CAPSULE ORAL
Qty: 270 CAPSULE | OUTPATIENT
Start: 2024-01-29

## 2024-01-29 RX ORDER — ALPRAZOLAM 0.5 MG/1
TABLET ORAL
Qty: 270 TABLET | OUTPATIENT
Start: 2024-01-29

## 2024-01-31 ENCOUNTER — TELEPHONE (OUTPATIENT)
Dept: PRIMARY CARE CLINIC | Age: 79
End: 2024-01-31

## 2024-01-31 NOTE — TELEPHONE ENCOUNTER
She saw you last Monday and still has sore throat and cough. Is there something else she should be taking that will get rid of it?

## 2024-02-01 DIAGNOSIS — J98.8 RESPIRATORY TRACT INFECTION: Primary | ICD-10-CM

## 2024-02-01 RX ORDER — AMOXICILLIN 500 MG/1
500 CAPSULE ORAL 3 TIMES DAILY
Qty: 30 CAPSULE | Refills: 0 | Status: SHIPPED | OUTPATIENT
Start: 2024-02-01 | End: 2024-02-11

## 2024-02-27 DIAGNOSIS — R21 RASH: ICD-10-CM

## 2024-02-27 RX ORDER — DOXEPIN HYDROCHLORIDE 25 MG/1
CAPSULE ORAL
Qty: 90 CAPSULE | Refills: 1 | Status: SHIPPED | OUTPATIENT
Start: 2024-02-27 | End: 2024-03-01

## 2024-03-01 DIAGNOSIS — F41.9 ANXIETY: Primary | ICD-10-CM

## 2024-03-01 DIAGNOSIS — R21 RASH: ICD-10-CM

## 2024-03-01 RX ORDER — ALPRAZOLAM 0.5 MG/1
TABLET ORAL
Qty: 270 TABLET | Refills: 0 | Status: SHIPPED | OUTPATIENT
Start: 2024-03-01 | End: 2024-05-30

## 2024-03-01 RX ORDER — DOXEPIN HYDROCHLORIDE 25 MG/1
CAPSULE ORAL
Qty: 270 CAPSULE | Refills: 0 | Status: SHIPPED | OUTPATIENT
Start: 2024-03-01

## 2024-03-28 DIAGNOSIS — R21 RASH: ICD-10-CM

## 2024-03-28 DIAGNOSIS — N95.1 MENOPAUSAL SYMPTOM: ICD-10-CM

## 2024-03-28 RX ORDER — DOXEPIN HYDROCHLORIDE 25 MG/1
CAPSULE ORAL
Qty: 270 CAPSULE | Refills: 0 | Status: SHIPPED | OUTPATIENT
Start: 2024-03-28

## 2024-04-18 DIAGNOSIS — R25.2 MUSCLE CRAMPS: ICD-10-CM

## 2024-04-18 DIAGNOSIS — M54.31 SCIATICA OF RIGHT SIDE: ICD-10-CM

## 2024-04-18 RX ORDER — CYCLOBENZAPRINE HCL 10 MG
10 TABLET ORAL DAILY PRN
Qty: 30 TABLET | Refills: 5 | Status: SHIPPED | OUTPATIENT
Start: 2024-04-18

## 2024-05-16 ENCOUNTER — TELEPHONE (OUTPATIENT)
Dept: FAMILY MEDICINE CLINIC | Age: 79
End: 2024-05-16

## 2024-05-16 NOTE — TELEPHONE ENCOUNTER
Called pt to set up Medicare AWV with PCP since she has not seen him for awhile. She will call back later today to schedule.

## 2024-06-05 ENCOUNTER — OFFICE VISIT (OUTPATIENT)
Dept: PRIMARY CARE CLINIC | Age: 79
End: 2024-06-05
Payer: MEDICARE

## 2024-06-05 VITALS
BODY MASS INDEX: 31 KG/M2 | WEIGHT: 175 LBS | HEART RATE: 83 BPM | DIASTOLIC BLOOD PRESSURE: 80 MMHG | SYSTOLIC BLOOD PRESSURE: 124 MMHG | OXYGEN SATURATION: 97 %

## 2024-06-05 DIAGNOSIS — R05.3 CHRONIC COUGH: ICD-10-CM

## 2024-06-05 DIAGNOSIS — Z00.00 PREVENTATIVE HEALTH CARE: ICD-10-CM

## 2024-06-05 DIAGNOSIS — R73.9 HYPERGLYCEMIA: Primary | ICD-10-CM

## 2024-06-05 DIAGNOSIS — E78.49 OTHER HYPERLIPIDEMIA: ICD-10-CM

## 2024-06-05 DIAGNOSIS — Z00.00 MEDICARE ANNUAL WELLNESS VISIT, SUBSEQUENT: ICD-10-CM

## 2024-06-05 DIAGNOSIS — E03.9 HYPOTHYROIDISM, UNSPECIFIED TYPE: ICD-10-CM

## 2024-06-05 DIAGNOSIS — D64.9 ANEMIA, UNSPECIFIED TYPE: ICD-10-CM

## 2024-06-05 PROCEDURE — G0439 PPPS, SUBSEQ VISIT: HCPCS | Performed by: INTERNAL MEDICINE

## 2024-06-05 PROCEDURE — 1123F ACP DISCUSS/DSCN MKR DOCD: CPT | Performed by: INTERNAL MEDICINE

## 2024-06-05 RX ORDER — ACETAMINOPHEN, GUAIFENESIN, AND PHENYLEPHRINE HYDROCHLORIDE 650; 400; 10 MG/20ML; MG/20ML; MG/20ML
SOLUTION ORAL
COMMUNITY

## 2024-06-05 RX ORDER — GUAIFENESIN 100 MG/5ML
200 SOLUTION ORAL EVERY 4 HOURS PRN
Qty: 473 ML | Refills: 11 | Status: SHIPPED | OUTPATIENT
Start: 2024-06-05

## 2024-06-05 RX ORDER — ALPRAZOLAM 0.5 MG/1
0.5 TABLET ORAL NIGHTLY PRN
COMMUNITY

## 2024-06-05 RX ORDER — ALUMINA, MAGNESIA, AND SIMETHICONE 2400; 2400; 240 MG/30ML; MG/30ML; MG/30ML
30 SUSPENSION ORAL EVERY 6 HOURS PRN
COMMUNITY

## 2024-06-05 SDOH — ECONOMIC STABILITY: FOOD INSECURITY: WITHIN THE PAST 12 MONTHS, YOU WORRIED THAT YOUR FOOD WOULD RUN OUT BEFORE YOU GOT MONEY TO BUY MORE.: NEVER TRUE

## 2024-06-05 SDOH — ECONOMIC STABILITY: FOOD INSECURITY: WITHIN THE PAST 12 MONTHS, THE FOOD YOU BOUGHT JUST DIDN'T LAST AND YOU DIDN'T HAVE MONEY TO GET MORE.: NEVER TRUE

## 2024-06-05 SDOH — ECONOMIC STABILITY: INCOME INSECURITY: HOW HARD IS IT FOR YOU TO PAY FOR THE VERY BASICS LIKE FOOD, HOUSING, MEDICAL CARE, AND HEATING?: NOT HARD AT ALL

## 2024-06-05 ASSESSMENT — PATIENT HEALTH QUESTIONNAIRE - PHQ9
SUM OF ALL RESPONSES TO PHQ QUESTIONS 1-9: 0
2. FEELING DOWN, DEPRESSED OR HOPELESS: NOT AT ALL
1. LITTLE INTEREST OR PLEASURE IN DOING THINGS: NOT AT ALL
SUM OF ALL RESPONSES TO PHQ QUESTIONS 1-9: 0
SUM OF ALL RESPONSES TO PHQ9 QUESTIONS 1 & 2: 0
SUM OF ALL RESPONSES TO PHQ QUESTIONS 1-9: 0
SUM OF ALL RESPONSES TO PHQ QUESTIONS 1-9: 0

## 2024-06-05 ASSESSMENT — LIFESTYLE VARIABLES
HOW OFTEN DO YOU HAVE A DRINK CONTAINING ALCOHOL: NEVER
HOW MANY STANDARD DRINKS CONTAINING ALCOHOL DO YOU HAVE ON A TYPICAL DAY: PATIENT DOES NOT DRINK

## 2024-06-05 NOTE — PROGRESS NOTES
Janessa Decker 79 y.o. female presents today with   Chief Complaint   Patient presents with    Medicare AW       HPIannual wellness visit    Past Medical History:   Diagnosis Date    Anxiety     Irritable bowel syndrome with diarrhea     Migraine     Sciatica of right side 4/27/2017    Vertigo      Patient Active Problem List    Diagnosis Date Noted    Migraine without aura and with status migrainosus, not intractable 05/22/2020    Dizziness and giddiness 05/22/2020    Benign paroxysmal vertigo of both ears 05/22/2020    Closed fracture of right distal fibula 09/05/2018    Acute right-sided low back pain with right-sided sciatica 04/27/2017    Sciatica of right side 04/27/2017     Past Surgical History:   Procedure Laterality Date    HYSTERECTOMY (CERVIX STATUS UNKNOWN)       No family history on file.  Social History     Socioeconomic History    Marital status:      Spouse name: None    Number of children: None    Years of education: None    Highest education level: None   Tobacco Use    Smoking status: Never    Smokeless tobacco: Never   Substance and Sexual Activity    Alcohol use: Yes     Comment: 1 daily    Drug use: No    Sexual activity: Yes     Social Determinants of Health     Financial Resource Strain: Low Risk  (6/5/2024)    Overall Financial Resource Strain (CARDIA)     Difficulty of Paying Living Expenses: Not hard at all   Food Insecurity: No Food Insecurity (6/5/2024)    Hunger Vital Sign     Worried About Running Out of Food in the Last Year: Never true     Ran Out of Food in the Last Year: Never true   Transportation Needs: Unknown (6/5/2024)    PRAPARE - Transportation     Lack of Transportation (Non-Medical): No   Physical Activity: Inactive (6/5/2024)    Exercise Vital Sign     Days of Exercise per Week: 0 days     Minutes of Exercise per Session: 0 min   Housing Stability: Unknown (6/5/2024)    Housing Stability Vital Sign     Unstable Housing in the Last Year: No     Allergies

## 2024-06-12 ASSESSMENT — ENCOUNTER SYMPTOMS
BLOOD IN STOOL: 0
CHOKING: 0
APNEA: 0
PHOTOPHOBIA: 0
ABDOMINAL DISTENTION: 0
FACIAL SWELLING: 0

## 2024-06-13 NOTE — PATIENT INSTRUCTIONS
to swim, bike, or do other activities. Bit by bit, increase the time you're active every day. Try for at least 30 minutes on most days of the week.     Try to quit or cut back on using tobacco and other nicotine products. This includes smoking and vaping. If you need help quitting, talk to your doctor about stop-smoking programs and medicines. These can increase your chances of quitting for good. Quitting is one of the most important things you can do to protect your heart. It is never too late to quit. Try to avoid secondhand smoke too.     Stay at a weight that's healthy for you. Talk to your doctor if you need help losing weight.     Try to get 7 to 9 hours of sleep each night.     Limit alcohol to 2 drinks a day for men and 1 drink a day for women. Too much alcohol can cause health problems.     Manage other health problems such as diabetes, high blood pressure, and high cholesterol. If you think you may have a problem with alcohol or drug use, talk to your doctor.   Medicines    Take your medicines exactly as prescribed. Call your doctor if you think you are having a problem with your medicine.     If your doctor recommends aspirin, take the amount directed each day. Make sure you take aspirin and not another kind of pain reliever, such as acetaminophen (Tylenol).   When should you call for help?   Call 911 if you have symptoms of a heart attack. These may include:    Chest pain or pressure, or a strange feeling in the chest.     Sweating.     Shortness of breath.     Pain, pressure, or a strange feeling in the back, neck, jaw, or upper belly or in one or both shoulders or arms.     Lightheadedness or sudden weakness.     A fast or irregular heartbeat.   After you call 911, the  may tell you to chew 1 adult-strength or 2 to 4 low-dose aspirin. Wait for an ambulance. Do not try to drive yourself.  Watch closely for changes in your health, and be sure to contact your doctor if you have any

## 2024-06-24 DIAGNOSIS — R42 VERTIGO: ICD-10-CM

## 2024-06-24 RX ORDER — MECLIZINE HYDROCHLORIDE 25 MG/1
25 TABLET ORAL 2 TIMES DAILY PRN
Qty: 60 TABLET | Refills: 5 | Status: SHIPPED | OUTPATIENT
Start: 2024-06-24

## 2024-07-09 DIAGNOSIS — M54.31 SCIATICA OF RIGHT SIDE: ICD-10-CM

## 2024-07-09 DIAGNOSIS — R25.2 MUSCLE CRAMPS: ICD-10-CM

## 2024-07-09 RX ORDER — CYCLOBENZAPRINE HCL 10 MG
10 TABLET ORAL DAILY PRN
Qty: 30 TABLET | Refills: 5 | Status: SHIPPED | OUTPATIENT
Start: 2024-07-09

## 2024-10-04 ENCOUNTER — TELEPHONE (OUTPATIENT)
Dept: PRIMARY CARE CLINIC | Age: 79
End: 2024-10-04

## 2024-10-04 DIAGNOSIS — N95.1 MENOPAUSAL SYMPTOM: ICD-10-CM

## 2024-10-04 NOTE — TELEPHONE ENCOUNTER
Received a call from the patient who stated would like advice from provider abhay vazquezld take the flu shot this year.

## 2024-10-09 ENCOUNTER — OFFICE VISIT (OUTPATIENT)
Dept: PRIMARY CARE CLINIC | Age: 79
End: 2024-10-09
Payer: MEDICARE

## 2024-10-09 VITALS
BODY MASS INDEX: 28.53 KG/M2 | HEART RATE: 71 BPM | WEIGHT: 161 LBS | OXYGEN SATURATION: 98 % | HEIGHT: 63 IN | DIASTOLIC BLOOD PRESSURE: 84 MMHG | SYSTOLIC BLOOD PRESSURE: 134 MMHG

## 2024-10-09 DIAGNOSIS — R42 VERTIGO: ICD-10-CM

## 2024-10-09 DIAGNOSIS — Z23 NEED FOR INFLUENZA VACCINATION: ICD-10-CM

## 2024-10-09 DIAGNOSIS — R05.2 SUBACUTE COUGH: Primary | ICD-10-CM

## 2024-10-09 PROCEDURE — 99213 OFFICE O/P EST LOW 20 MIN: CPT | Performed by: INTERNAL MEDICINE

## 2024-10-09 PROCEDURE — 1090F PRES/ABSN URINE INCON ASSESS: CPT | Performed by: INTERNAL MEDICINE

## 2024-10-09 PROCEDURE — G0008 ADMIN INFLUENZA VIRUS VAC: HCPCS | Performed by: INTERNAL MEDICINE

## 2024-10-09 PROCEDURE — G8399 PT W/DXA RESULTS DOCUMENT: HCPCS | Performed by: INTERNAL MEDICINE

## 2024-10-09 PROCEDURE — 1123F ACP DISCUSS/DSCN MKR DOCD: CPT | Performed by: INTERNAL MEDICINE

## 2024-10-09 PROCEDURE — G8482 FLU IMMUNIZE ORDER/ADMIN: HCPCS | Performed by: INTERNAL MEDICINE

## 2024-10-09 PROCEDURE — 1036F TOBACCO NON-USER: CPT | Performed by: INTERNAL MEDICINE

## 2024-10-09 PROCEDURE — 90653 IIV ADJUVANT VACCINE IM: CPT | Performed by: INTERNAL MEDICINE

## 2024-10-09 PROCEDURE — G8417 CALC BMI ABV UP PARAM F/U: HCPCS | Performed by: INTERNAL MEDICINE

## 2024-10-09 PROCEDURE — G8427 DOCREV CUR MEDS BY ELIG CLIN: HCPCS | Performed by: INTERNAL MEDICINE

## 2024-10-09 RX ORDER — MECLIZINE HYDROCHLORIDE 25 MG/1
25 TABLET ORAL 3 TIMES DAILY PRN
Qty: 90 TABLET | Refills: 5 | Status: SHIPPED | OUTPATIENT
Start: 2024-10-09

## 2024-10-09 NOTE — PROGRESS NOTES
Janessa Decker 79 y.o. female presents today with   Chief Complaint   Patient presents with    Post-COVID Symptoms     Covid x 2 1/2 weeks ago    Cough       Cough  This is a new problem. The current episode started 1 to 4 weeks ago. The problem has been waxing and waning. Pertinent negatives include no chest pain, chills, fever or rash.   Dizziness  This is a chronic problem. The current episode started more than 1 year ago. The problem occurs daily. The problem has been waxing and waning. Associated symptoms include coughing. Pertinent negatives include no chest pain, chills, fever, joint swelling or rash. The symptoms are aggravated by bending and twisting.    pnd on and off, more in the evening.      Past Medical History:   Diagnosis Date    Anxiety     Irritable bowel syndrome with diarrhea     Migraine     Sciatica of right side 4/27/2017    Vertigo      Patient Active Problem List    Diagnosis Date Noted    Migraine without aura and with status migrainosus, not intractable 05/22/2020    Dizziness and giddiness 05/22/2020    Benign paroxysmal vertigo of both ears 05/22/2020    Closed fracture of right distal fibula 09/05/2018    Acute right-sided low back pain with right-sided sciatica 04/27/2017    Sciatica of right side 04/27/2017     Past Surgical History:   Procedure Laterality Date    HYSTERECTOMY (CERVIX STATUS UNKNOWN)       No family history on file.  Social History     Socioeconomic History    Marital status:      Spouse name: None    Number of children: None    Years of education: None    Highest education level: None   Tobacco Use    Smoking status: Never    Smokeless tobacco: Never   Substance and Sexual Activity    Alcohol use: Yes     Comment: 1 daily    Drug use: No    Sexual activity: Yes     Social Determinants of Health     Financial Resource Strain: Low Risk  (6/5/2024)    Overall Financial Resource Strain (CARDIA)     Difficulty of Paying Living Expenses: Not hard at all   Food

## 2024-10-21 RX ORDER — ALPRAZOLAM 0.5 MG
TABLET ORAL
Qty: 270 TABLET | OUTPATIENT
Start: 2024-10-21

## 2024-10-23 DIAGNOSIS — F41.9 ANXIETY: Primary | ICD-10-CM

## 2024-10-23 RX ORDER — ALPRAZOLAM 0.5 MG
0.5 TABLET ORAL NIGHTLY PRN
Qty: 30 TABLET | Refills: 2 | Status: SHIPPED | OUTPATIENT
Start: 2024-10-23 | End: 2025-01-21

## 2024-11-20 DIAGNOSIS — F41.9 ANXIETY: ICD-10-CM

## 2024-11-20 RX ORDER — ALPRAZOLAM 0.5 MG
0.5 TABLET ORAL NIGHTLY PRN
Qty: 30 TABLET | Refills: 2 | Status: SHIPPED | OUTPATIENT
Start: 2024-11-20 | End: 2025-02-18

## 2024-12-03 DIAGNOSIS — M54.31 SCIATICA OF RIGHT SIDE: ICD-10-CM

## 2024-12-03 DIAGNOSIS — R25.2 MUSCLE CRAMPS: ICD-10-CM

## 2024-12-03 RX ORDER — CYCLOBENZAPRINE HCL 10 MG
10 TABLET ORAL DAILY PRN
Qty: 30 TABLET | Refills: 5 | Status: SHIPPED | OUTPATIENT
Start: 2024-12-03

## 2024-12-17 DIAGNOSIS — G43.001 MIGRAINE WITHOUT AURA AND WITH STATUS MIGRAINOSUS, NOT INTRACTABLE: ICD-10-CM

## 2024-12-17 RX ORDER — SUMATRIPTAN SUCCINATE 100 MG/1
100 TABLET ORAL DAILY PRN
Qty: 9 TABLET | Refills: 5 | Status: SHIPPED | OUTPATIENT
Start: 2024-12-17

## 2024-12-17 NOTE — TELEPHONE ENCOUNTER
Rx request   Requested Prescriptions     Pending Prescriptions Disp Refills    SUMAtriptan (IMITREX) 100 MG tablet [Pharmacy Med Name: SUMATRIPTAN 100MG TABLETS] 9 tablet 5     Sig: TAKE 1 TABLET BY MOUTH DAILY AS NEEDED FOR MIGRAINE     LOV 10/9/2024  Next Visit Date:  No future appointments.

## 2024-12-23 DIAGNOSIS — F41.9 ANXIETY: ICD-10-CM

## 2024-12-23 RX ORDER — ALPRAZOLAM 0.5 MG
0.5 TABLET ORAL NIGHTLY PRN
Qty: 30 TABLET | Refills: 2 | Status: SHIPPED | OUTPATIENT
Start: 2024-12-23 | End: 2025-03-23

## 2025-01-21 ENCOUNTER — TELEPHONE (OUTPATIENT)
Dept: PRIMARY CARE CLINIC | Age: 80
End: 2025-01-21

## 2025-01-22 DIAGNOSIS — R30.0 DYSURIA: Primary | ICD-10-CM

## 2025-01-22 RX ORDER — CIPROFLOXACIN 500 MG/1
500 TABLET, FILM COATED ORAL 2 TIMES DAILY
Qty: 14 TABLET | Refills: 0 | Status: SHIPPED | OUTPATIENT
Start: 2025-01-22 | End: 2025-01-29

## 2025-01-27 ENCOUNTER — OFFICE VISIT (OUTPATIENT)
Dept: PRIMARY CARE CLINIC | Age: 80
End: 2025-01-27

## 2025-01-27 VITALS
DIASTOLIC BLOOD PRESSURE: 86 MMHG | HEART RATE: 82 BPM | WEIGHT: 170 LBS | OXYGEN SATURATION: 98 % | HEIGHT: 63 IN | SYSTOLIC BLOOD PRESSURE: 142 MMHG | BODY MASS INDEX: 30.12 KG/M2

## 2025-01-27 DIAGNOSIS — E53.8 VITAMIN B 12 DEFICIENCY: ICD-10-CM

## 2025-01-27 DIAGNOSIS — R73.9 HYPERGLYCEMIA: ICD-10-CM

## 2025-01-27 DIAGNOSIS — E55.9 VITAMIN D DEFICIENCY: ICD-10-CM

## 2025-01-27 DIAGNOSIS — R41.0 CONFUSED: ICD-10-CM

## 2025-01-27 DIAGNOSIS — E78.49 OTHER HYPERLIPIDEMIA: ICD-10-CM

## 2025-01-27 DIAGNOSIS — E03.9 HYPOTHYROIDISM, UNSPECIFIED TYPE: ICD-10-CM

## 2025-01-27 DIAGNOSIS — D64.9 ANEMIA, UNSPECIFIED TYPE: ICD-10-CM

## 2025-01-27 DIAGNOSIS — E53.8 FOLIC ACID DEFICIENCY: ICD-10-CM

## 2025-01-27 DIAGNOSIS — D51.1 VIT B12 DEFIC ANEMIA D/T SLCTV VIT B12 MALABSORP W PROTEIN: ICD-10-CM

## 2025-01-27 DIAGNOSIS — Z00.00 PREVENTATIVE HEALTH CARE: ICD-10-CM

## 2025-01-27 DIAGNOSIS — Z00.00 MEDICARE ANNUAL WELLNESS VISIT, SUBSEQUENT: Primary | ICD-10-CM

## 2025-01-27 LAB
ALBUMIN SERPL-MCNC: 4.3 G/DL (ref 3.5–4.6)
ALP SERPL-CCNC: 95 U/L (ref 40–130)
ALT SERPL-CCNC: <5 U/L (ref 0–33)
ANION GAP SERPL CALCULATED.3IONS-SCNC: 13 MEQ/L (ref 9–15)
AST SERPL-CCNC: 15 U/L (ref 0–35)
BASOPHILS # BLD: 0 K/UL (ref 0–0.2)
BASOPHILS NFR BLD: 0.3 %
BILIRUB SERPL-MCNC: 0.3 MG/DL (ref 0.2–0.7)
BUN SERPL-MCNC: 11 MG/DL (ref 8–23)
CALCIUM SERPL-MCNC: 9.5 MG/DL (ref 8.5–9.9)
CHLORIDE SERPL-SCNC: 103 MEQ/L (ref 95–107)
CHOLEST SERPL-MCNC: 206 MG/DL (ref 0–199)
CO2 SERPL-SCNC: 25 MEQ/L (ref 20–31)
CREAT SERPL-MCNC: 0.7 MG/DL (ref 0.5–0.9)
EOSINOPHIL # BLD: 0.3 K/UL (ref 0–0.7)
EOSINOPHIL NFR BLD: 3.6 %
ERYTHROCYTE [DISTWIDTH] IN BLOOD BY AUTOMATED COUNT: 12.5 % (ref 11.5–14.5)
GLOBULIN SER CALC-MCNC: 3 G/DL (ref 2.3–3.5)
GLUCOSE SERPL-MCNC: 87 MG/DL (ref 70–99)
HCT VFR BLD AUTO: 46.2 % (ref 37–47)
HDLC SERPL-MCNC: 66 MG/DL (ref 40–59)
HGB BLD-MCNC: 15 G/DL (ref 12–16)
LDLC SERPL CALC-MCNC: 96 MG/DL (ref 0–129)
LYMPHOCYTES # BLD: 2.3 K/UL (ref 1–4.8)
LYMPHOCYTES NFR BLD: 24.4 %
MCH RBC QN AUTO: 28.1 PG (ref 27–31.3)
MCHC RBC AUTO-ENTMCNC: 32.5 % (ref 33–37)
MCV RBC AUTO: 86.5 FL (ref 79.4–94.8)
MONOCYTES # BLD: 0.6 K/UL (ref 0.2–0.8)
MONOCYTES NFR BLD: 6.7 %
NEUTROPHILS # BLD: 6 K/UL (ref 1.4–6.5)
NEUTS SEG NFR BLD: 64.6 %
PLATELET # BLD AUTO: 272 K/UL (ref 130–400)
POTASSIUM SERPL-SCNC: 4.3 MEQ/L (ref 3.4–4.9)
PROT SERPL-MCNC: 7.3 G/DL (ref 6.3–8)
RBC # BLD AUTO: 5.34 M/UL (ref 4.2–5.4)
SODIUM SERPL-SCNC: 141 MEQ/L (ref 135–144)
TRIGL SERPL-MCNC: 219 MG/DL (ref 0–150)
TSH REFLEX: 2.05 UIU/ML (ref 0.44–3.86)
WBC # BLD AUTO: 9.2 K/UL (ref 4.8–10.8)

## 2025-01-27 SDOH — ECONOMIC STABILITY: FOOD INSECURITY: WITHIN THE PAST 12 MONTHS, THE FOOD YOU BOUGHT JUST DIDN'T LAST AND YOU DIDN'T HAVE MONEY TO GET MORE.: NEVER TRUE

## 2025-01-27 SDOH — ECONOMIC STABILITY: FOOD INSECURITY: WITHIN THE PAST 12 MONTHS, YOU WORRIED THAT YOUR FOOD WOULD RUN OUT BEFORE YOU GOT MONEY TO BUY MORE.: NEVER TRUE

## 2025-01-27 ASSESSMENT — LIFESTYLE VARIABLES
HOW MANY STANDARD DRINKS CONTAINING ALCOHOL DO YOU HAVE ON A TYPICAL DAY: 1 OR 2
HOW OFTEN DO YOU HAVE A DRINK CONTAINING ALCOHOL: 2-3 TIMES A WEEK

## 2025-01-27 ASSESSMENT — ENCOUNTER SYMPTOMS
APNEA: 0
PHOTOPHOBIA: 0
ABDOMINAL DISTENTION: 0
CHOKING: 0
FACIAL SWELLING: 0
BLOOD IN STOOL: 0

## 2025-01-27 ASSESSMENT — PATIENT HEALTH QUESTIONNAIRE - PHQ9
SUM OF ALL RESPONSES TO PHQ QUESTIONS 1-9: 0
SUM OF ALL RESPONSES TO PHQ QUESTIONS 1-9: 0
2. FEELING DOWN, DEPRESSED OR HOPELESS: NOT AT ALL
1. LITTLE INTEREST OR PLEASURE IN DOING THINGS: NOT AT ALL
SUM OF ALL RESPONSES TO PHQ9 QUESTIONS 1 & 2: 0
SUM OF ALL RESPONSES TO PHQ QUESTIONS 1-9: 0
SUM OF ALL RESPONSES TO PHQ QUESTIONS 1-9: 0

## 2025-01-27 NOTE — PROGRESS NOTES
Janessa Decker 79 y.o. female presents today with   Chief Complaint   Patient presents with    Memory Loss     Daughter in law states that they noticed some \" sun downing\"    Medicare AWV    Hallucinations     Vision and hearing     Annual wellness visit  Memory Loss    Patient reports onset of memory loss was 1 to 6 months ago. Onset quality is gradual.   son and daughter inlaw. She lives alone, confusion? Hallucinations. They discuss to her about living assist.  She said she rarely takes her xanax.     Seen a scott shoes x1 2 weeks ago, she was watching TV.   She hears dog running through the house.    House will be clean from moles.    Past Medical History:   Diagnosis Date    Anxiety     Irritable bowel syndrome with diarrhea     Migraine     Sciatica of right side 4/27/2017    Vertigo      Patient Active Problem List    Diagnosis Date Noted    Migraine without aura and with status migrainosus, not intractable 05/22/2020    Dizziness and giddiness 05/22/2020    Benign paroxysmal vertigo of both ears 05/22/2020    Closed fracture of right distal fibula 09/05/2018    Acute right-sided low back pain with right-sided sciatica 04/27/2017    Sciatica of right side 04/27/2017     Past Surgical History:   Procedure Laterality Date    HYSTERECTOMY (CERVIX STATUS UNKNOWN)       No family history on file.  Social History     Socioeconomic History    Marital status:      Spouse name: None    Number of children: None    Years of education: None    Highest education level: None   Tobacco Use    Smoking status: Never    Smokeless tobacco: Never   Substance and Sexual Activity    Alcohol use: Yes     Comment: 1 daily    Drug use: No    Sexual activity: Yes     Social Determinants of Health     Financial Resource Strain: Low Risk  (6/5/2024)    Overall Financial Resource Strain (CARDIA)     Difficulty of Paying Living Expenses: Not hard at all   Food Insecurity: No Food Insecurity (1/27/2025)    Hunger Vital Sign

## 2025-01-28 LAB
ESTIMATED AVERAGE GLUCOSE: 100 MG/DL
FOLATE: 7.9 NG/ML (ref 4.8–24.2)
HBA1C MFR BLD: 5.1 % (ref 4–6)
VITAMIN B-12: 225 PG/ML (ref 232–1245)
VITAMIN D 25-HYDROXY: 54.5 NG/ML (ref 30–100)

## 2025-01-31 NOTE — PATIENT INSTRUCTIONS
Learning About Being Active as an Older Adult  Why is being active important as you get older?     Being active is one of the best things you can do for your health. And it's never too late to start. Being active--or getting active, if you aren't already--has definite benefits. It can:  Give you more energy,  Keep your mind sharp.  Improve balance to reduce your risk of falls.  Help you manage chronic illness with fewer medicines.  No matter how old you are, how fit you are, or what health problems you have, there is a form of activity that will work for you. And the more physical activity you can do, the better your overall health will be.  What kinds of activity can help you stay healthy?  Being more active will make your daily activities easier. Physical activity includes planned exercise and things you do in daily life. There are four types of activity:  Aerobic.  Doing aerobic activity makes your heart and lungs strong.  Includes walking, dancing, and gardening.  Aim for at least 2½ hours spread throughout the week.  It improves your energy and can help you sleep better.  Muscle-strengthening.  This type of activity can help maintain muscle and strengthen bones.  Includes climbing stairs, using resistance bands, and lifting or carrying heavy loads.  Aim for at least twice a week.  It can help protect the knees and other joints.  Stretching.  Stretching gives you better range of motion in joints and muscles.  Includes upper arm stretches, calf stretches, and gentle yoga.  Aim for at least twice a week, preferably after your muscles are warmed up from other activities.  It can help you function better in daily life.  Balancing.  This helps you stay coordinated and have good posture.  Includes heel-to-toe walking, [employer], and certain types of yoga.  Aim for at least 3 days a week.  It can reduce your risk of falling.  Even if you have a hard time meeting the recommendations, it's better to be more active

## 2025-03-04 NOTE — TELEPHONE ENCOUNTER
Jennifer Tenorio requesting refill Please approve or deny refill request. Medication pended.     Last OV: 1-17-17  Last RX: 2-20-18    IS THIS A CONTROLLED MEDICATION: No      [facility]  [address]  [place] [address]  Phone: [phone] Fax: [phone]      Patient was advised to check with their pharmacy within 24-48 hours: no    Next Visit Date:  Future Appointments  Date Time Provider Sonali Patel   4/10/2018 1:40 PM [address] [facility], [place] [address]       Requested Prescriptions     Pending Prescriptions Disp Refills    PREMARIN 0.625 MG/GM vaginal cream [Pharmacy Med Name: PREMARIN VAGINAL CREAM 30GM] 30 g 0     Sig: PLACE 0.5 GRAM VAGINALLY DAILY Reviewed today in the office with the patient.

## 2025-03-24 ENCOUNTER — OFFICE VISIT (OUTPATIENT)
Dept: PRIMARY CARE CLINIC | Age: 80
End: 2025-03-24

## 2025-03-24 VITALS
BODY MASS INDEX: 30.47 KG/M2 | WEIGHT: 172 LBS | SYSTOLIC BLOOD PRESSURE: 160 MMHG | DIASTOLIC BLOOD PRESSURE: 88 MMHG | HEART RATE: 96 BPM | OXYGEN SATURATION: 96 %

## 2025-03-24 DIAGNOSIS — M54.10 BACK PAIN WITH LEFT-SIDED RADICULOPATHY: Primary | ICD-10-CM

## 2025-03-24 DIAGNOSIS — M25.562 ACUTE PAIN OF LEFT KNEE: ICD-10-CM

## 2025-03-24 DIAGNOSIS — M25.552 HIP PAIN, LEFT: ICD-10-CM

## 2025-03-24 RX ORDER — ALPRAZOLAM 0.5 MG
TABLET ORAL
COMMUNITY
Start: 2025-02-26

## 2025-03-24 ASSESSMENT — ENCOUNTER SYMPTOMS
APNEA: 0
CHOKING: 0
ABDOMINAL DISTENTION: 0
ABDOMINAL PAIN: 0
FACIAL SWELLING: 0
BLOOD IN STOOL: 0
PHOTOPHOBIA: 0
BACK PAIN: 1

## 2025-03-24 NOTE — PROGRESS NOTES
Sexual Activity    Alcohol use: Yes     Comment: 1 daily    Drug use: No    Sexual activity: Yes     Social Drivers of Health     Financial Resource Strain: Low Risk  (6/5/2024)    Overall Financial Resource Strain (CARDIA)     Difficulty of Paying Living Expenses: Not hard at all   Food Insecurity: No Food Insecurity (1/27/2025)    Hunger Vital Sign     Worried About Running Out of Food in the Last Year: Never true     Ran Out of Food in the Last Year: Never true   Transportation Needs: No Transportation Needs (1/27/2025)    PRAPARE - Transportation     Lack of Transportation (Medical): No     Lack of Transportation (Non-Medical): No   Physical Activity: Inactive (1/27/2025)    Exercise Vital Sign     Days of Exercise per Week: 0 days     Minutes of Exercise per Session: 0 min   Housing Stability: Low Risk  (1/27/2025)    Housing Stability Vital Sign     Unable to Pay for Housing in the Last Year: No     Number of Times Moved in the Last Year: 0     Homeless in the Last Year: No     Allergies   Allergen Reactions    Bactrim [Sulfamethoxazole-Trimethoprim] Shortness Of Breath    Egg White (Egg Protein) Diarrhea    Macrobid [Nitrofurantoin Macrocrystal]      SOB       Review of Systems   Constitutional:  Negative for chills and fever.   HENT:  Negative for facial swelling and nosebleeds.    Eyes:  Negative for photophobia and visual disturbance.   Respiratory:  Negative for apnea and choking.    Cardiovascular:  Negative for chest pain and palpitations.   Gastrointestinal:  Negative for abdominal distention, abdominal pain and blood in stool.   Genitourinary:  Negative for enuresis, hematuria and vaginal bleeding.   Musculoskeletal:  Positive for back pain. Negative for gait problem and joint swelling.   Skin:  Negative for rash.   Neurological:  Positive for numbness. Negative for syncope and speech difficulty.   Hematological:  Does not bruise/bleed easily.   Psychiatric/Behavioral:  Negative for hallucinations and

## 2025-03-25 ENCOUNTER — TELEPHONE (OUTPATIENT)
Dept: PRIMARY CARE CLINIC | Age: 80
End: 2025-03-25

## 2025-03-25 DIAGNOSIS — M54.10 BACK PAIN WITH LEFT-SIDED RADICULOPATHY: Primary | ICD-10-CM

## 2025-03-25 RX ORDER — HYDROCODONE BITARTRATE AND ACETAMINOPHEN 5; 325 MG/1; MG/1
1 TABLET ORAL EVERY 6 HOURS PRN
Qty: 28 TABLET | Refills: 0 | Status: SHIPPED | OUTPATIENT
Start: 2025-03-25 | End: 2025-04-01

## 2025-03-26 ENCOUNTER — RESULTS FOLLOW-UP (OUTPATIENT)
Dept: PRIMARY CARE CLINIC | Age: 80
End: 2025-03-26

## 2025-03-26 NOTE — TELEPHONE ENCOUNTER
Pts daughter answered when I went to notify pt of the rx being sent. Says she is worried ab her mother due to the amount of pain she is in and is also worried that she is suffering from early stages of dementia. Was wondering what they can and should do for her?

## 2025-04-15 ENCOUNTER — TELEPHONE (OUTPATIENT)
Dept: PRIMARY CARE CLINIC | Age: 80
End: 2025-04-15

## 2025-04-16 ENCOUNTER — OFFICE VISIT (OUTPATIENT)
Dept: PRIMARY CARE CLINIC | Age: 80
End: 2025-04-16
Payer: MEDICARE

## 2025-04-16 VITALS
WEIGHT: 174 LBS | HEIGHT: 63 IN | OXYGEN SATURATION: 97 % | BODY MASS INDEX: 30.83 KG/M2 | SYSTOLIC BLOOD PRESSURE: 130 MMHG | HEART RATE: 95 BPM | DIASTOLIC BLOOD PRESSURE: 80 MMHG

## 2025-04-16 DIAGNOSIS — M25.562 ACUTE PAIN OF LEFT KNEE: ICD-10-CM

## 2025-04-16 DIAGNOSIS — M54.31 SCIATICA OF RIGHT SIDE: ICD-10-CM

## 2025-04-16 DIAGNOSIS — M25.552 HIP PAIN, LEFT: Primary | ICD-10-CM

## 2025-04-16 DIAGNOSIS — M54.10 BACK PAIN WITH LEFT-SIDED RADICULOPATHY: ICD-10-CM

## 2025-04-16 DIAGNOSIS — R25.2 MUSCLE CRAMPS: ICD-10-CM

## 2025-04-16 PROCEDURE — 99214 OFFICE O/P EST MOD 30 MIN: CPT | Performed by: INTERNAL MEDICINE

## 2025-04-16 PROCEDURE — 1123F ACP DISCUSS/DSCN MKR DOCD: CPT | Performed by: INTERNAL MEDICINE

## 2025-04-16 PROCEDURE — 1159F MED LIST DOCD IN RCRD: CPT | Performed by: INTERNAL MEDICINE

## 2025-04-16 RX ORDER — CYCLOBENZAPRINE HCL 5 MG
5 TABLET ORAL 2 TIMES DAILY PRN
Qty: 60 TABLET | Refills: 5 | Status: SHIPPED | OUTPATIENT
Start: 2025-04-16

## 2025-04-16 RX ORDER — HYDROCODONE BITARTRATE AND ACETAMINOPHEN 5; 325 MG/1; MG/1
1 TABLET ORAL EVERY 6 HOURS PRN
Qty: 28 TABLET | Refills: 0 | Status: SHIPPED | OUTPATIENT
Start: 2025-04-16 | End: 2025-04-23

## 2025-04-16 RX ORDER — NYSTATIN 100000 U/G
CREAM TOPICAL
COMMUNITY
Start: 2025-04-07

## 2025-04-16 NOTE — PROGRESS NOTES
true     Ran Out of Food in the Last Year: Never true   Transportation Needs: No Transportation Needs (1/27/2025)    PRAPARE - Transportation     Lack of Transportation (Medical): No     Lack of Transportation (Non-Medical): No   Physical Activity: Inactive (1/27/2025)    Exercise Vital Sign     Days of Exercise per Week: 0 days     Minutes of Exercise per Session: 0 min   Housing Stability: Low Risk  (1/27/2025)    Housing Stability Vital Sign     Unable to Pay for Housing in the Last Year: No     Number of Times Moved in the Last Year: 0     Homeless in the Last Year: No     Allergies   Allergen Reactions    Bactrim [Sulfamethoxazole-Trimethoprim] Shortness Of Breath    Egg White (Egg Protein) Diarrhea    Macrobid [Nitrofurantoin Macrocrystal]      SOB       Review of Systems   Constitutional:  Negative for chills and fever.   HENT:  Negative for facial swelling and nosebleeds.    Eyes:  Negative for photophobia and visual disturbance.   Respiratory:  Negative for apnea and choking.    Cardiovascular:  Negative for chest pain and palpitations.   Gastrointestinal:  Negative for abdominal distention and blood in stool.   Genitourinary:  Negative for enuresis, hematuria and vaginal bleeding.   Musculoskeletal:  Positive for arthralgias, back pain, gait problem and myalgias. Negative for joint swelling.   Skin:  Negative for rash.   Neurological:  Negative for syncope and speech difficulty.   Hematological:  Does not bruise/bleed easily.   Psychiatric/Behavioral:  Negative for hallucinations and suicidal ideas.            Vitals:    04/16/25 1336   BP: 130/80   Pulse: 95   SpO2: 97%   Weight: 78.9 kg (174 lb)       Physical Exam  Constitutional:       Appearance: She is well-developed.   HENT:      Head: Normocephalic.   Eyes:      Conjunctiva/sclera: Conjunctivae normal.   Cardiovascular:      Rate and Rhythm: Normal rate and regular rhythm.      Heart sounds: Normal heart sounds.   Pulmonary:      Effort: No

## 2025-05-20 ENCOUNTER — TELEPHONE (OUTPATIENT)
Dept: PRIMARY CARE CLINIC | Age: 80
End: 2025-05-20

## 2025-05-23 DIAGNOSIS — T78.40XA ALLERGY, INITIAL ENCOUNTER: Primary | ICD-10-CM

## 2025-05-23 DIAGNOSIS — T78.40XA ALLERGY, INITIAL ENCOUNTER: ICD-10-CM

## 2025-05-23 RX ORDER — PREDNISONE 10 MG/1
10 TABLET ORAL DAILY
Qty: 10 TABLET | Refills: 0 | Status: SHIPPED | OUTPATIENT
Start: 2025-05-23 | End: 2025-06-02

## 2025-05-23 RX ORDER — CETIRIZINE HYDROCHLORIDE 10 MG/1
10 TABLET ORAL DAILY
Qty: 30 TABLET | Refills: 5 | Status: SHIPPED | OUTPATIENT
Start: 2025-05-23

## 2025-05-23 RX ORDER — FLUTICASONE PROPIONATE 50 MCG
SPRAY, SUSPENSION (ML) NASAL
Qty: 48 G | OUTPATIENT
Start: 2025-05-23

## 2025-05-23 RX ORDER — FLUTICASONE PROPIONATE 50 MCG
2 SPRAY, SUSPENSION (ML) NASAL DAILY PRN
Qty: 16 G | Refills: 1 | Status: SHIPPED | OUTPATIENT
Start: 2025-05-23

## 2025-06-11 ENCOUNTER — OFFICE VISIT (OUTPATIENT)
Dept: PRIMARY CARE CLINIC | Age: 80
End: 2025-06-11
Payer: MEDICARE

## 2025-06-11 VITALS
SYSTOLIC BLOOD PRESSURE: 138 MMHG | HEART RATE: 88 BPM | OXYGEN SATURATION: 95 % | WEIGHT: 180 LBS | HEIGHT: 63 IN | BODY MASS INDEX: 31.89 KG/M2 | DIASTOLIC BLOOD PRESSURE: 88 MMHG

## 2025-06-11 DIAGNOSIS — N39.3 STRESS INCONTINENCE OF URINE: Primary | ICD-10-CM

## 2025-06-11 DIAGNOSIS — N39.3 STRESS INCONTINENCE OF URINE: ICD-10-CM

## 2025-06-11 DIAGNOSIS — M25.561 ACUTE PAIN OF RIGHT KNEE: ICD-10-CM

## 2025-06-11 PROCEDURE — 1123F ACP DISCUSS/DSCN MKR DOCD: CPT | Performed by: INTERNAL MEDICINE

## 2025-06-11 PROCEDURE — 1159F MED LIST DOCD IN RCRD: CPT | Performed by: INTERNAL MEDICINE

## 2025-06-11 PROCEDURE — 99213 OFFICE O/P EST LOW 20 MIN: CPT | Performed by: INTERNAL MEDICINE

## 2025-06-11 RX ORDER — SOLIFENACIN SUCCINATE 10 MG/1
10 TABLET, FILM COATED ORAL DAILY
Qty: 30 TABLET | Refills: 5 | Status: SHIPPED | OUTPATIENT
Start: 2025-06-11 | End: 2025-12-08

## 2025-06-11 RX ORDER — SOLIFENACIN SUCCINATE 10 MG/1
10 TABLET, FILM COATED ORAL DAILY
Qty: 90 TABLET | OUTPATIENT
Start: 2025-06-11 | End: 2025-12-08

## 2025-06-11 NOTE — PROGRESS NOTES
Year: Never true     Ran Out of Food in the Last Year: Never true   Transportation Needs: No Transportation Needs (1/27/2025)    PRAPARE - Transportation     Lack of Transportation (Medical): No     Lack of Transportation (Non-Medical): No   Physical Activity: Inactive (1/27/2025)    Exercise Vital Sign     Days of Exercise per Week: 0 days     Minutes of Exercise per Session: 0 min   Housing Stability: Low Risk  (1/27/2025)    Housing Stability Vital Sign     Unable to Pay for Housing in the Last Year: No     Number of Times Moved in the Last Year: 0     Homeless in the Last Year: No     Allergies   Allergen Reactions    Bactrim [Sulfamethoxazole-Trimethoprim] Shortness Of Breath    Egg White (Egg Protein) Diarrhea    Macrobid [Nitrofurantoin Macrocrystal]      SOB       Review of Systems   Constitutional:  Negative for chills and fever.   HENT:  Negative for facial swelling and nosebleeds.    Eyes:  Negative for photophobia and visual disturbance.   Respiratory:  Negative for apnea and choking.    Cardiovascular:  Negative for chest pain and palpitations.   Gastrointestinal:  Negative for abdominal distention and blood in stool.   Genitourinary:  Positive for bladder incontinence. Negative for enuresis, hematuria and vaginal bleeding.   Musculoskeletal:  Negative for gait problem and joint swelling.   Skin:  Negative for rash.   Neurological:  Negative for syncope and speech difficulty.   Hematological:  Does not bruise/bleed easily.   Psychiatric/Behavioral:  Negative for hallucinations and suicidal ideas.            Vitals:    06/11/25 1347   BP: 138/88   Pulse: 88   SpO2: 95%   Weight: 81.6 kg (180 lb)   Height: 1.6 m (5' 3\")       Physical Exam  Constitutional:       Appearance: She is well-developed.   HENT:      Head: Normocephalic.   Eyes:      Conjunctiva/sclera: Conjunctivae normal.   Cardiovascular:      Rate and Rhythm: Normal rate and regular rhythm.      Heart sounds: Normal heart sounds.   Pulmonary:

## 2025-06-12 DIAGNOSIS — T78.40XA ALLERGY, INITIAL ENCOUNTER: ICD-10-CM

## 2025-06-12 RX ORDER — PREDNISONE 10 MG/1
10 TABLET ORAL DAILY
Qty: 10 TABLET | Refills: 0 | Status: SHIPPED | OUTPATIENT
Start: 2025-06-12 | End: 2025-06-22

## 2025-06-13 DIAGNOSIS — N95.1 MENOPAUSAL SYMPTOM: ICD-10-CM

## 2025-06-13 RX ORDER — CONJUGATED ESTROGENS 0.3 MG/1
0.3 TABLET, FILM COATED ORAL DAILY
Qty: 30 TABLET | Refills: 5 | Status: SHIPPED | OUTPATIENT
Start: 2025-06-13

## 2025-06-13 NOTE — TELEPHONE ENCOUNTER
Comments:     Last Office Visit (last PCP visit):   6/11/2025    Next Visit Date:  No future appointments.    **If hasn't been seen in over a year OR hasn't followed up according to last diabetes/ADHD visit, make appointment for patient before sending refill to provider.    Rx requested:  Requested Prescriptions     Pending Prescriptions Disp Refills    PREMARIN 0.3 MG tablet [Pharmacy Med Name: PREMARIN 0.3MG TABLETS] 30 tablet 5     Sig: TAKE 1 TABLET BY MOUTH DAILY

## 2025-06-16 DIAGNOSIS — R25.2 MUSCLE CRAMPS: Primary | ICD-10-CM

## 2025-06-16 DIAGNOSIS — F41.9 ANXIETY: ICD-10-CM

## 2025-06-16 RX ORDER — ALPRAZOLAM 0.5 MG
TABLET ORAL
Qty: 30 TABLET | Refills: 2 | Status: SHIPPED | OUTPATIENT
Start: 2025-06-16 | End: 2025-09-14

## 2025-08-06 DIAGNOSIS — R25.2 MUSCLE CRAMPS: ICD-10-CM

## 2025-08-06 DIAGNOSIS — M54.31 SCIATICA OF RIGHT SIDE: ICD-10-CM

## 2025-08-06 RX ORDER — CYCLOBENZAPRINE HCL 10 MG
10 TABLET ORAL DAILY PRN
Qty: 30 TABLET | Refills: 5 | Status: SHIPPED | OUTPATIENT
Start: 2025-08-06

## 2025-08-26 DIAGNOSIS — R42 VERTIGO: ICD-10-CM

## 2025-08-26 RX ORDER — MECLIZINE HYDROCHLORIDE 25 MG/1
TABLET ORAL
Qty: 90 TABLET | Refills: 5 | Status: SHIPPED | OUTPATIENT
Start: 2025-08-26